# Patient Record
Sex: MALE | Race: WHITE | NOT HISPANIC OR LATINO | URBAN - METROPOLITAN AREA
[De-identification: names, ages, dates, MRNs, and addresses within clinical notes are randomized per-mention and may not be internally consistent; named-entity substitution may affect disease eponyms.]

---

## 2024-10-02 ENCOUNTER — INPATIENT (INPATIENT)
Facility: HOSPITAL | Age: 69
LOS: 7 days | Discharge: NURSING, REHAB OR LONG-TERM CARE | DRG: 563 | End: 2024-10-10
Attending: STUDENT IN AN ORGANIZED HEALTH CARE EDUCATION/TRAINING PROGRAM | Admitting: STUDENT IN AN ORGANIZED HEALTH CARE EDUCATION/TRAINING PROGRAM
Payer: COMMERCIAL

## 2024-10-02 VITALS
SYSTOLIC BLOOD PRESSURE: 164 MMHG | OXYGEN SATURATION: 98 % | RESPIRATION RATE: 18 BRPM | HEART RATE: 92 BPM | DIASTOLIC BLOOD PRESSURE: 80 MMHG | TEMPERATURE: 98 F

## 2024-10-02 LAB
ALBUMIN SERPL ELPH-MCNC: 4.7 G/DL — SIGNIFICANT CHANGE UP (ref 3.3–5)
ALP SERPL-CCNC: 99 U/L — SIGNIFICANT CHANGE UP (ref 40–120)
ALT FLD-CCNC: 59 U/L — HIGH (ref 10–45)
ANION GAP SERPL CALC-SCNC: 16 MMOL/L — SIGNIFICANT CHANGE UP (ref 5–17)
ANISOCYTOSIS BLD QL: SLIGHT — SIGNIFICANT CHANGE UP
APTT BLD: 29.1 SEC — SIGNIFICANT CHANGE UP (ref 24.5–35.6)
AST SERPL-CCNC: 40 U/L — SIGNIFICANT CHANGE UP (ref 10–40)
BASOPHILS # BLD AUTO: 0.18 K/UL — SIGNIFICANT CHANGE UP (ref 0–0.2)
BASOPHILS NFR BLD AUTO: 0.9 % — SIGNIFICANT CHANGE UP (ref 0–2)
BILIRUB SERPL-MCNC: 0.4 MG/DL — SIGNIFICANT CHANGE UP (ref 0.2–1.2)
BUN SERPL-MCNC: 16 MG/DL — SIGNIFICANT CHANGE UP (ref 7–23)
BURR CELLS BLD QL SMEAR: PRESENT — SIGNIFICANT CHANGE UP
CALCIUM SERPL-MCNC: 9.5 MG/DL — SIGNIFICANT CHANGE UP (ref 8.4–10.5)
CHLORIDE SERPL-SCNC: 105 MMOL/L — SIGNIFICANT CHANGE UP (ref 96–108)
CO2 SERPL-SCNC: 19 MMOL/L — LOW (ref 22–31)
CREAT SERPL-MCNC: 1.18 MG/DL — SIGNIFICANT CHANGE UP (ref 0.5–1.3)
DACRYOCYTES BLD QL SMEAR: SLIGHT — SIGNIFICANT CHANGE UP
EGFR: 68 ML/MIN/1.73M2 — SIGNIFICANT CHANGE UP
ELLIPTOCYTES BLD QL SMEAR: SLIGHT — SIGNIFICANT CHANGE UP
EOSINOPHIL # BLD AUTO: 0.18 K/UL — SIGNIFICANT CHANGE UP (ref 0–0.5)
EOSINOPHIL NFR BLD AUTO: 0.9 % — SIGNIFICANT CHANGE UP (ref 0–6)
ETHANOL SERPL-MCNC: 295 MG/DL — HIGH (ref 0–10)
GAS PNL BLDV: SIGNIFICANT CHANGE UP
GAS PNL BLDV: SIGNIFICANT CHANGE UP
GLUCOSE SERPL-MCNC: 148 MG/DL — HIGH (ref 70–99)
HCT VFR BLD CALC: 39.9 % — SIGNIFICANT CHANGE UP (ref 39–50)
HGB BLD-MCNC: 12.7 G/DL — LOW (ref 13–17)
HYPOCHROMIA BLD QL: SLIGHT — SIGNIFICANT CHANGE UP
INR BLD: 0.97 RATIO — SIGNIFICANT CHANGE UP (ref 0.85–1.16)
LYMPHOCYTES # BLD AUTO: 13 % — SIGNIFICANT CHANGE UP (ref 13–44)
LYMPHOCYTES # BLD AUTO: 2.54 K/UL — SIGNIFICANT CHANGE UP (ref 1–3.3)
MACROCYTES BLD QL: SLIGHT — SIGNIFICANT CHANGE UP
MANUAL SMEAR VERIFICATION: SIGNIFICANT CHANGE UP
MCHC RBC-ENTMCNC: 20.6 PG — LOW (ref 27–34)
MCHC RBC-ENTMCNC: 31.8 GM/DL — LOW (ref 32–36)
MCV RBC AUTO: 64.7 FL — LOW (ref 80–100)
MICROCYTES BLD QL: SIGNIFICANT CHANGE UP
MONOCYTES # BLD AUTO: 0.33 K/UL — SIGNIFICANT CHANGE UP (ref 0–0.9)
MONOCYTES NFR BLD AUTO: 1.7 % — LOW (ref 2–14)
NEUTROPHILS # BLD AUTO: 16.33 K/UL — HIGH (ref 1.8–7.4)
NEUTROPHILS NFR BLD AUTO: 83.5 % — HIGH (ref 43–77)
OVALOCYTES BLD QL SMEAR: SLIGHT — SIGNIFICANT CHANGE UP
PLAT MORPH BLD: NORMAL — SIGNIFICANT CHANGE UP
PLATELET # BLD AUTO: 336 K/UL — SIGNIFICANT CHANGE UP (ref 150–400)
POLYCHROMASIA BLD QL SMEAR: SLIGHT — SIGNIFICANT CHANGE UP
POTASSIUM SERPL-MCNC: 4.3 MMOL/L — SIGNIFICANT CHANGE UP (ref 3.5–5.3)
POTASSIUM SERPL-SCNC: 4.3 MMOL/L — SIGNIFICANT CHANGE UP (ref 3.5–5.3)
PROT SERPL-MCNC: 8.1 G/DL — SIGNIFICANT CHANGE UP (ref 6–8.3)
PROTHROM AB SERPL-ACNC: 11.1 SEC — SIGNIFICANT CHANGE UP (ref 9.9–13.4)
RBC # BLD: 6.17 M/UL — HIGH (ref 4.2–5.8)
RBC # FLD: 17.1 % — HIGH (ref 10.3–14.5)
RBC BLD AUTO: ABNORMAL
SCHISTOCYTES BLD QL AUTO: SLIGHT — SIGNIFICANT CHANGE UP
SODIUM SERPL-SCNC: 140 MMOL/L — SIGNIFICANT CHANGE UP (ref 135–145)
TARGETS BLD QL SMEAR: SLIGHT — SIGNIFICANT CHANGE UP
TROPONIN T, HIGH SENSITIVITY RESULT: 19 NG/L — SIGNIFICANT CHANGE UP (ref 0–51)
WBC # BLD: 19.56 K/UL — HIGH (ref 3.8–10.5)
WBC # FLD AUTO: 19.56 K/UL — HIGH (ref 3.8–10.5)

## 2024-10-02 PROCEDURE — 71260 CT THORAX DX C+: CPT | Mod: 26,MC

## 2024-10-02 PROCEDURE — 74177 CT ABD & PELVIS W/CONTRAST: CPT | Mod: 26,MC

## 2024-10-02 PROCEDURE — 72170 X-RAY EXAM OF PELVIS: CPT | Mod: 26

## 2024-10-02 PROCEDURE — 73000 X-RAY EXAM OF COLLAR BONE: CPT | Mod: 26,LT

## 2024-10-02 PROCEDURE — 73060 X-RAY EXAM OF HUMERUS: CPT | Mod: 26,LT

## 2024-10-02 PROCEDURE — 71045 X-RAY EXAM CHEST 1 VIEW: CPT | Mod: 26

## 2024-10-02 PROCEDURE — 70450 CT HEAD/BRAIN W/O DYE: CPT | Mod: 26,MC

## 2024-10-02 PROCEDURE — 99285 EMERGENCY DEPT VISIT HI MDM: CPT

## 2024-10-02 PROCEDURE — 73030 X-RAY EXAM OF SHOULDER: CPT | Mod: 26,LT

## 2024-10-02 PROCEDURE — 72125 CT NECK SPINE W/O DYE: CPT | Mod: 26,MC

## 2024-10-02 RX ORDER — FENTANYL CITRATE-0.9 % NACL/PF 300MCG/30
50 PATIENT CONTROLLED ANALGESIA VIAL INJECTION ONCE
Refills: 0 | Status: DISCONTINUED | OUTPATIENT
Start: 2024-10-02 | End: 2024-10-02

## 2024-10-02 RX ORDER — SODIUM CHLORIDE 0.9 % (FLUSH) 0.9 %
500 SYRINGE (ML) INJECTION ONCE
Refills: 0 | Status: COMPLETED | OUTPATIENT
Start: 2024-10-02 | End: 2024-10-02

## 2024-10-02 RX ORDER — MORPHINE SULFATE 30 MG/1
4 TABLET, FILM COATED, EXTENDED RELEASE ORAL ONCE
Refills: 0 | Status: DISCONTINUED | OUTPATIENT
Start: 2024-10-02 | End: 2024-10-02

## 2024-10-02 RX ORDER — FOLIC ACID 1 MG/1
1 TABLET ORAL ONCE
Refills: 0 | Status: COMPLETED | OUTPATIENT
Start: 2024-10-02 | End: 2024-10-02

## 2024-10-02 RX ORDER — THIAMINE HYDROCHLORIDE 100 MG/ML
100 INJECTION, SOLUTION INTRAMUSCULAR; INTRAVENOUS ONCE
Refills: 0 | Status: COMPLETED | OUTPATIENT
Start: 2024-10-02 | End: 2024-10-02

## 2024-10-02 RX ORDER — OXYCODONE HYDROCHLORIDE 30 MG/1
5 TABLET, FILM COATED, EXTENDED RELEASE ORAL ONCE
Refills: 0 | Status: DISCONTINUED | OUTPATIENT
Start: 2024-10-02 | End: 2024-10-02

## 2024-10-02 RX ORDER — ACETAMINOPHEN 325 MG
1000 TABLET ORAL ONCE
Refills: 0 | Status: COMPLETED | OUTPATIENT
Start: 2024-10-02 | End: 2024-10-02

## 2024-10-02 RX ADMIN — Medication 50 MICROGRAM(S): at 19:15

## 2024-10-02 RX ADMIN — Medication 400 MILLIGRAM(S): at 19:49

## 2024-10-02 RX ADMIN — Medication 500 MILLILITER(S): at 22:56

## 2024-10-02 RX ADMIN — MORPHINE SULFATE 4 MILLIGRAM(S): 30 TABLET, FILM COATED, EXTENDED RELEASE ORAL at 22:24

## 2024-10-02 RX ADMIN — FOLIC ACID 1 MILLIGRAM(S): 1 TABLET ORAL at 20:41

## 2024-10-02 RX ADMIN — THIAMINE HYDROCHLORIDE 100 MILLIGRAM(S): 100 INJECTION, SOLUTION INTRAMUSCULAR; INTRAVENOUS at 20:41

## 2024-10-02 NOTE — ED PROVIDER NOTE - OTHER FINDINGS
ECG recorded at 7:18 PM independently interpreted by me , Dr Wilfredo Baird,  at 7:22 PM shows normal sinus rhythm normal axis normal intervals QTc 445 ms no acute diagnostic ischemic ST-T changes

## 2024-10-02 NOTE — ED PROVIDER NOTE - CARE PLAN
Principal Discharge DX:	Fracture of shaft of left clavicle  Secondary Diagnosis:	Acute alcohol intoxication   1

## 2024-10-02 NOTE — ED ADULT TRIAGE NOTE - AVIAN FLU SYMPTOMS
Notified pt of CT urogram results and Dr. Hernandez's recommendation to f/u in 6 mo with [facility]. Pt verbalizes understanding, is agreeable to plan, and has no questions or concerns.  
No

## 2024-10-02 NOTE — ED ADULT NURSE NOTE - OBJECTIVE STATEMENT
69 y male presents s/p fall. Pt BIBEMS, EMS states pt was found head first at bottom of 10 step flight of stairs. pt endorses ETOH ingestion but does not specify specific amount stating "not that much". Pt states he does not remember falling or how he fell, unknown LOC. Pt complaining of bilateral shoulder pain with the left shoulder having more severe pain. Pt not consistently answering questions during assessment therefore unable to get full subjective assessment.

## 2024-10-02 NOTE — ED PROVIDER NOTE - ATTENDING CONTRIBUTION TO CARE
Attending MD Baird:  I have seen and examined this patient and fully participated in the care of this patient as the teaching attending. I personally made/approved the management plan and take responsibility for the patient management.        69-year-old gentleman with a history of alcohol misuse disorder COPD hypertension is presenting via EMS for a fall down a flight of stairs.  EMS reports that he fell down possibly 10 stairs.  He was found at the bottom face first on the ground.  He was responsive but clinically intoxicated further assessment.  The patient himself is complaining of left shoulder pain at this time, he is awake and alert but smells of alcohol and clinically appears intoxicated.  He states he does not remember what happened to him today but does admit to drinking alcohol but would not quantify exactly how much he drank.    Patient's vital signs are nonactionable.  He is awake and alert GCS is 14.  Head is atraumatic cervical collar in place.  No obvious facial injury trachea midline the chest wall is nontender breath sounds present equal bilateral anterior lung fields abdomen nontender no bony pelvis tenderness.  There is tenderness of the left mid clavicle and anterior shoulder distal pulses palpable patient moves all extremity spontaneously.    Patient is presenting for evaluation of shoulder pain after fall down a flight of stairs, clinically intoxicated at this time admits to alcohol.  Given possible fall down flight of stairs will obtain pan CT scan x-rays of the left shoulder clavicle and humerus, analgesia alcohol level and reassessment      *The above represents an initial assessment/impression. Please refer to progress notes for potential changes in patient clinical course*

## 2024-10-02 NOTE — ED PROVIDER NOTE - PROGRESS NOTE DETAILS
lynne- Spoke  with Ortho resident regarding clavicular fracture.  X-rays reviewed.  States that this will likely require operative intervention but does not require stat orthopedics consult.  Patient can follow-up outpatient

## 2024-10-03 DIAGNOSIS — Z29.9 ENCOUNTER FOR PROPHYLACTIC MEASURES, UNSPECIFIED: ICD-10-CM

## 2024-10-03 DIAGNOSIS — F10.929 ALCOHOL USE, UNSPECIFIED WITH INTOXICATION, UNSPECIFIED: ICD-10-CM

## 2024-10-03 DIAGNOSIS — S42.022A DISPLACED FRACTURE OF SHAFT OF LEFT CLAVICLE, INITIAL ENCOUNTER FOR CLOSED FRACTURE: ICD-10-CM

## 2024-10-03 DIAGNOSIS — J44.9 CHRONIC OBSTRUCTIVE PULMONARY DISEASE, UNSPECIFIED: ICD-10-CM
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
## 2024-10-03 DIAGNOSIS — S42.009A FRACTURE OF UNSPECIFIED PART OF UNSPECIFIED CLAVICLE, INITIAL ENCOUNTER FOR CLOSED FRACTURE: ICD-10-CM

## 2024-10-03 LAB
ALBUMIN SERPL ELPH-MCNC: 4.2 G/DL — SIGNIFICANT CHANGE UP (ref 3.3–5)
ALP SERPL-CCNC: 86 U/L — SIGNIFICANT CHANGE UP (ref 40–120)
ALT FLD-CCNC: 53 U/L — HIGH (ref 10–45)
AMPHET UR-MCNC: NEGATIVE — SIGNIFICANT CHANGE UP
ANION GAP SERPL CALC-SCNC: 15 MMOL/L — SIGNIFICANT CHANGE UP (ref 5–17)
ANION GAP SERPL CALC-SCNC: 16 MMOL/L — SIGNIFICANT CHANGE UP (ref 5–17)
APPEARANCE UR: CLEAR — SIGNIFICANT CHANGE UP
APTT BLD: 29.9 SEC — SIGNIFICANT CHANGE UP (ref 24.5–35.6)
AST SERPL-CCNC: 66 U/L — HIGH (ref 10–40)
BACTERIA # UR AUTO: NEGATIVE /HPF — SIGNIFICANT CHANGE UP
BARBITURATES UR SCN-MCNC: NEGATIVE — SIGNIFICANT CHANGE UP
BENZODIAZ UR-MCNC: NEGATIVE — SIGNIFICANT CHANGE UP
BILIRUB DIRECT SERPL-MCNC: 0.2 MG/DL — SIGNIFICANT CHANGE UP (ref 0–0.3)
BILIRUB INDIRECT FLD-MCNC: 0.5 MG/DL — SIGNIFICANT CHANGE UP (ref 0.2–1)
BILIRUB SERPL-MCNC: 0.7 MG/DL — SIGNIFICANT CHANGE UP (ref 0.2–1.2)
BILIRUB UR-MCNC: NEGATIVE — SIGNIFICANT CHANGE UP
BUN SERPL-MCNC: 13 MG/DL — SIGNIFICANT CHANGE UP (ref 7–23)
BUN SERPL-MCNC: 14 MG/DL — SIGNIFICANT CHANGE UP (ref 7–23)
CALCIUM SERPL-MCNC: 9.1 MG/DL — SIGNIFICANT CHANGE UP (ref 8.4–10.5)
CALCIUM SERPL-MCNC: 9.4 MG/DL — SIGNIFICANT CHANGE UP (ref 8.4–10.5)
CAST: 2 /LPF — SIGNIFICANT CHANGE UP (ref 0–4)
CHLORIDE SERPL-SCNC: 106 MMOL/L — SIGNIFICANT CHANGE UP (ref 96–108)
CHLORIDE SERPL-SCNC: 108 MMOL/L — SIGNIFICANT CHANGE UP (ref 96–108)
CO2 SERPL-SCNC: 18 MMOL/L — LOW (ref 22–31)
CO2 SERPL-SCNC: 19 MMOL/L — LOW (ref 22–31)
COCAINE METAB.OTHER UR-MCNC: NEGATIVE — SIGNIFICANT CHANGE UP
COLOR SPEC: YELLOW — SIGNIFICANT CHANGE UP
CREAT SERPL-MCNC: 0.98 MG/DL — SIGNIFICANT CHANGE UP (ref 0.5–1.3)
CREAT SERPL-MCNC: 1.04 MG/DL — SIGNIFICANT CHANGE UP (ref 0.5–1.3)
DIFF PNL FLD: ABNORMAL
EGFR: 79 ML/MIN/1.73M2 — SIGNIFICANT CHANGE UP
EGFR: 85 ML/MIN/1.73M2 — SIGNIFICANT CHANGE UP
ETHANOL SERPL-MCNC: 78 MG/DL — HIGH (ref 0–10)
GLUCOSE SERPL-MCNC: 134 MG/DL — HIGH (ref 70–99)
GLUCOSE SERPL-MCNC: 136 MG/DL — HIGH (ref 70–99)
GLUCOSE UR QL: NEGATIVE MG/DL — SIGNIFICANT CHANGE UP
HCT VFR BLD CALC: 33.9 % — LOW (ref 39–50)
HGB BLD-MCNC: 11.1 G/DL — LOW (ref 13–17)
HYALINE CASTS # UR AUTO: PRESENT
INR BLD: 1.06 RATIO — SIGNIFICANT CHANGE UP (ref 0.85–1.16)
KETONES UR-MCNC: NEGATIVE MG/DL — SIGNIFICANT CHANGE UP
LACTATE BLDV-MCNC: 1.6 MMOL/L — SIGNIFICANT CHANGE UP (ref 0.5–2)
LACTATE SERPL-SCNC: 3.7 MMOL/L — HIGH (ref 0.5–2)
LEUKOCYTE ESTERASE UR-ACNC: NEGATIVE — SIGNIFICANT CHANGE UP
MAGNESIUM SERPL-MCNC: 1.6 MG/DL — SIGNIFICANT CHANGE UP (ref 1.6–2.6)
MCHC RBC-ENTMCNC: 20.7 PG — LOW (ref 27–34)
MCHC RBC-ENTMCNC: 32.7 GM/DL — SIGNIFICANT CHANGE UP (ref 32–36)
MCV RBC AUTO: 63.1 FL — LOW (ref 80–100)
METHADONE UR-MCNC: NEGATIVE — SIGNIFICANT CHANGE UP
NITRITE UR-MCNC: NEGATIVE — SIGNIFICANT CHANGE UP
NRBC # BLD: 0 /100 WBCS — SIGNIFICANT CHANGE UP (ref 0–0)
OPIATES UR-MCNC: POSITIVE
OXYCODONE UR-MCNC: NEGATIVE — SIGNIFICANT CHANGE UP
PCP SPEC-MCNC: SIGNIFICANT CHANGE UP
PCP UR-MCNC: NEGATIVE — SIGNIFICANT CHANGE UP
PH UR: 5.5 — SIGNIFICANT CHANGE UP (ref 5–8)
PHOSPHATE SERPL-MCNC: 3.5 MG/DL — SIGNIFICANT CHANGE UP (ref 2.5–4.5)
PLATELET # BLD AUTO: 284 K/UL — SIGNIFICANT CHANGE UP (ref 150–400)
POTASSIUM SERPL-MCNC: 4.4 MMOL/L — SIGNIFICANT CHANGE UP (ref 3.5–5.3)
POTASSIUM SERPL-MCNC: 4.5 MMOL/L — SIGNIFICANT CHANGE UP (ref 3.5–5.3)
POTASSIUM SERPL-SCNC: 4.4 MMOL/L — SIGNIFICANT CHANGE UP (ref 3.5–5.3)
POTASSIUM SERPL-SCNC: 4.5 MMOL/L — SIGNIFICANT CHANGE UP (ref 3.5–5.3)
PROT SERPL-MCNC: 7 G/DL — SIGNIFICANT CHANGE UP (ref 6–8.3)
PROT UR-MCNC: NEGATIVE MG/DL — SIGNIFICANT CHANGE UP
PROTHROM AB SERPL-ACNC: 12.1 SEC — SIGNIFICANT CHANGE UP (ref 9.9–13.4)
RBC # BLD: 5.37 M/UL — SIGNIFICANT CHANGE UP (ref 4.2–5.8)
RBC # FLD: 15.8 % — HIGH (ref 10.3–14.5)
RBC CASTS # UR COMP ASSIST: 0 /HPF — SIGNIFICANT CHANGE UP (ref 0–4)
REVIEW: SIGNIFICANT CHANGE UP
SODIUM SERPL-SCNC: 140 MMOL/L — SIGNIFICANT CHANGE UP (ref 135–145)
SODIUM SERPL-SCNC: 142 MMOL/L — SIGNIFICANT CHANGE UP (ref 135–145)
SP GR SPEC: >1.03 — HIGH (ref 1–1.03)
SQUAMOUS # UR AUTO: 0 /HPF — SIGNIFICANT CHANGE UP (ref 0–5)
THC UR QL: NEGATIVE — SIGNIFICANT CHANGE UP
UROBILINOGEN FLD QL: 0.2 MG/DL — SIGNIFICANT CHANGE UP (ref 0.2–1)
WBC # BLD: 13.32 K/UL — HIGH (ref 3.8–10.5)
WBC # FLD AUTO: 13.32 K/UL — HIGH (ref 3.8–10.5)
WBC UR QL: 0 /HPF — SIGNIFICANT CHANGE UP (ref 0–5)

## 2024-10-03 PROCEDURE — 99223 1ST HOSP IP/OBS HIGH 75: CPT

## 2024-10-03 PROCEDURE — 23570 CLTX SCAPULAR FX W/O MNPJ: CPT | Mod: RT

## 2024-10-03 PROCEDURE — 73000 X-RAY EXAM OF COLLAR BONE: CPT | Mod: 26,RT

## 2024-10-03 PROCEDURE — 23500 CLTX CLAVICULAR FX W/O MNPJ: CPT | Mod: 50

## 2024-10-03 PROCEDURE — 99222 1ST HOSP IP/OBS MODERATE 55: CPT | Mod: 57

## 2024-10-03 RX ORDER — MULTI VITAMIN/MINERAL SUPPLEMENT WITH ASCORBIC ACID, NIACIN, PYRIDOXINE, PANTOTHENIC ACID, FOLIC ACID, RIBOFLAVIN, THIAMIN, BIOTIN, COBALAMIN AND ZINC. 60; 20; 12.5; 10; 10; 1.7; 1.5; 1; .3; .006 MG/1; MG/1; MG/1; MG/1; MG/1; MG/1; MG/1; MG/1; MG/1; MG/1
1 TABLET, COATED ORAL DAILY
Refills: 0 | Status: DISCONTINUED | OUTPATIENT
Start: 2024-10-03 | End: 2024-10-10

## 2024-10-03 RX ORDER — SODIUM CHLORIDE 0.9 % (FLUSH) 0.9 %
1000 SYRINGE (ML) INJECTION
Refills: 0 | Status: DISCONTINUED | OUTPATIENT
Start: 2024-10-03 | End: 2024-10-03

## 2024-10-03 RX ORDER — MAGNESIUM SULFATE 500 MG/ML
2 VIAL (ML) INJECTION ONCE
Refills: 0 | Status: COMPLETED | OUTPATIENT
Start: 2024-10-03 | End: 2024-10-03

## 2024-10-03 RX ORDER — ONDANSETRON HCL/PF 4 MG/2 ML
4 VIAL (ML) INJECTION ONCE
Refills: 0 | Status: COMPLETED | OUTPATIENT
Start: 2024-10-03 | End: 2024-10-03

## 2024-10-03 RX ORDER — BENZOCAINE AND LEVOMENTHOL 200; 5 MG/G; MG/G
1 SPRAY TOPICAL THREE TIMES A DAY
Refills: 0 | Status: DISCONTINUED | OUTPATIENT
Start: 2024-10-03 | End: 2024-10-10

## 2024-10-03 RX ORDER — FLUTICASONE PROPION/SALMETEROL 100-50 MCG
1 BLISTER, WITH INHALATION DEVICE INHALATION
Refills: 0 | Status: DISCONTINUED | OUTPATIENT
Start: 2024-10-03 | End: 2024-10-10

## 2024-10-03 RX ORDER — FOLIC ACID 1 MG/1
1 TABLET ORAL DAILY
Refills: 0 | Status: DISCONTINUED | OUTPATIENT
Start: 2024-10-03 | End: 2024-10-10

## 2024-10-03 RX ORDER — INFLUENZA VIRUS VACCINE 15; 15; 15; 15 UG/.5ML; UG/.5ML; UG/.5ML; UG/.5ML
0.5 SUSPENSION INTRAMUSCULAR ONCE
Refills: 0 | Status: DISCONTINUED | OUTPATIENT
Start: 2024-10-03 | End: 2024-10-10

## 2024-10-03 RX ORDER — ACETAMINOPHEN 325 MG
650 TABLET ORAL EVERY 6 HOURS
Refills: 0 | Status: DISCONTINUED | OUTPATIENT
Start: 2024-10-03 | End: 2024-10-10

## 2024-10-03 RX ORDER — ATORVASTATIN CALCIUM 10 MG/1
40 TABLET, FILM COATED ORAL AT BEDTIME
Refills: 0 | Status: DISCONTINUED | OUTPATIENT
Start: 2024-10-03 | End: 2024-10-10

## 2024-10-03 RX ORDER — MORPHINE SULFATE 30 MG/1
4 TABLET, FILM COATED, EXTENDED RELEASE ORAL ONCE
Refills: 0 | Status: DISCONTINUED | OUTPATIENT
Start: 2024-10-03 | End: 2024-10-03

## 2024-10-03 RX ORDER — ONDANSETRON HCL/PF 4 MG/2 ML
4 VIAL (ML) INJECTION EVERY 8 HOURS
Refills: 0 | Status: DISCONTINUED | OUTPATIENT
Start: 2024-10-03 | End: 2024-10-10

## 2024-10-03 RX ORDER — OXYCODONE HYDROCHLORIDE 30 MG/1
5 TABLET, FILM COATED, EXTENDED RELEASE ORAL EVERY 6 HOURS
Refills: 0 | Status: DISCONTINUED | OUTPATIENT
Start: 2024-10-03 | End: 2024-10-10

## 2024-10-03 RX ORDER — ACETAMINOPHEN 325 MG
975 TABLET ORAL ONCE
Refills: 0 | Status: COMPLETED | OUTPATIENT
Start: 2024-10-03 | End: 2024-10-03

## 2024-10-03 RX ORDER — TIOTROPIUM BROMIDE INHALATION SPRAY 3.12 UG/1
2 SPRAY, METERED RESPIRATORY (INHALATION) DAILY
Refills: 0 | Status: DISCONTINUED | OUTPATIENT
Start: 2024-10-03 | End: 2024-10-10

## 2024-10-03 RX ORDER — THIAMINE HYDROCHLORIDE 100 MG/ML
100 INJECTION, SOLUTION INTRAMUSCULAR; INTRAVENOUS DAILY
Refills: 0 | Status: COMPLETED | OUTPATIENT
Start: 2024-10-03 | End: 2024-10-06

## 2024-10-03 RX ORDER — OXYCODONE HYDROCHLORIDE 30 MG/1
5 TABLET, FILM COATED, EXTENDED RELEASE ORAL EVERY 6 HOURS
Refills: 0 | Status: DISCONTINUED | OUTPATIENT
Start: 2024-10-03 | End: 2024-10-03

## 2024-10-03 RX ADMIN — OXYCODONE HYDROCHLORIDE 5 MILLIGRAM(S): 30 TABLET, FILM COATED, EXTENDED RELEASE ORAL at 17:15

## 2024-10-03 RX ADMIN — Medication 650 MILLIGRAM(S): at 21:34

## 2024-10-03 RX ADMIN — MORPHINE SULFATE 4 MILLIGRAM(S): 30 TABLET, FILM COATED, EXTENDED RELEASE ORAL at 03:09

## 2024-10-03 RX ADMIN — Medication 975 MILLIGRAM(S): at 00:44

## 2024-10-03 RX ADMIN — MULTI VITAMIN/MINERAL SUPPLEMENT WITH ASCORBIC ACID, NIACIN, PYRIDOXINE, PANTOTHENIC ACID, FOLIC ACID, RIBOFLAVIN, THIAMIN, BIOTIN, COBALAMIN AND ZINC. 1 TABLET(S): 60; 20; 12.5; 10; 10; 1.7; 1.5; 1; .3; .006 TABLET, COATED ORAL at 18:25

## 2024-10-03 RX ADMIN — OXYCODONE HYDROCHLORIDE 5 MILLIGRAM(S): 30 TABLET, FILM COATED, EXTENDED RELEASE ORAL at 06:00

## 2024-10-03 RX ADMIN — THIAMINE HYDROCHLORIDE 100 MILLIGRAM(S): 100 INJECTION, SOLUTION INTRAMUSCULAR; INTRAVENOUS at 18:25

## 2024-10-03 RX ADMIN — Medication 10 MILLIGRAM(S): at 05:41

## 2024-10-03 RX ADMIN — OXYCODONE HYDROCHLORIDE 5 MILLIGRAM(S): 30 TABLET, FILM COATED, EXTENDED RELEASE ORAL at 12:40

## 2024-10-03 RX ADMIN — Medication 150 MILLILITER(S): at 03:23

## 2024-10-03 RX ADMIN — ATORVASTATIN CALCIUM 40 MILLIGRAM(S): 10 TABLET, FILM COATED ORAL at 21:35

## 2024-10-03 RX ADMIN — Medication 4 MILLIGRAM(S): at 04:36

## 2024-10-03 RX ADMIN — Medication 1 DOSE(S): at 17:17

## 2024-10-03 RX ADMIN — Medication 25 GRAM(S): at 14:19

## 2024-10-03 RX ADMIN — Medication 10 MILLIGRAM(S): at 17:15

## 2024-10-03 RX ADMIN — FOLIC ACID 1 MILLIGRAM(S): 1 TABLET ORAL at 17:15

## 2024-10-03 RX ADMIN — BENZOCAINE AND LEVOMENTHOL 1 LOZENGE: 200; 5 SPRAY TOPICAL at 21:54

## 2024-10-03 RX ADMIN — OXYCODONE HYDROCHLORIDE 5 MILLIGRAM(S): 30 TABLET, FILM COATED, EXTENDED RELEASE ORAL at 13:40

## 2024-10-03 NOTE — H&P ADULT - MLM HIDDEN
Post-Anesthesia Evaluation & Assessment    Visit Vitals    /69    Pulse (!) 112    Temp 36.7 °C (98.1 °F)    Resp 17    Ht 5' 8\" (1.727 m)    Wt 99.8 kg (220 lb)    SpO2 100%    BMI 33.45 kg/m2       Post-operative hydration adequate. Pain score (VAS): 0 Pain Scale 1: Numeric (0 - 10) (10/04/17 1702)  Pain Intensity 1: 0 (10/04/17 1702)   Managed. Mental status & Level of consciousness: returned to baseline    Neurological status: returned to baseline    Pulmonary status: airway patent, oxygen given as needed. Complications related to anesthesia: none    Patient has met all discharge requirements.     Additional comments:        Anneliese Villalobos MD  October 5, 2017
yes

## 2024-10-03 NOTE — H&P ADULT - NSVTERISKASSESS_GEN_ALL_CORE FT
Medical Assessment Completed on: 03-Oct-2024 13:39 Medical Assessment Completed on: 03-Oct-2024 15:04

## 2024-10-03 NOTE — H&P ADULT - NSHPREVIEWOFSYSTEMS_GEN_ALL_CORE
CONSTITUTIONAL: No fever/chills.  HEENT: No cough. No runny nose.   RESPIRATORY: No shortness of breath. No wheezes.   CARDIOVASCULAR: No chest pain. No palpitations   GASTROINTESTINAL: No abdominal pain. No nausea/ vomiting. No diarrhea. No constipation. No melena/hematochezia.  GENITOURINARY: No dysuria, frequency or hematuria  NEUROLOGICAL: + headache   MSK: chronic back pain. acute bilateral shoulder pain.

## 2024-10-03 NOTE — CONSULT NOTE ADULT - SUBJECTIVE AND OBJECTIVE BOX
69y Male presents c/o b/l shoulder pain s/p mechanical fall down a flight of stairs at home. Pt has hx of alcohol misuse syndrome. Unable to deterimne headstrike/LOC. Denies numbness, tingling paresthesias in affected extremities. Unable to ambulate after fall. No other orthopedic injuries at this time.    PAST MEDICAL & SURGICAL HISTORY:    MEDICATIONS  (STANDING):    Allergies    No Known Allergies    Intolerances                            12.7   19.56 )-----------( 336      ( 02 Oct 2024 19:30 )             39.9     02 Oct 2024 19:30    140    |  105    |  16     ----------------------------<  148    4.3     |  19     |  1.18     Ca    9.5        02 Oct 2024 19:30    TPro  8.1    /  Alb  4.7    /  TBili  0.4    /  DBili  x      /  AST  40     /  ALT  59     /  AlkPhos  99     02 Oct 2024 19:30    PT/INR - ( 02 Oct 2024 19:30 )   PT: 11.1 sec;   INR: 0.97 ratio         PTT - ( 02 Oct 2024 19:30 )  PTT:29.1 sec    Imaging: XR personally reviewed and demonstates R nondisplaced Clavicle Fracture, L comminuted clavicle fx  CT chst shows above injuries + scapular spine fx    Vital Signs Last 24 Hrs  T(C): 36.6 (10-02-24 @ 19:20), Max: 36.6 (10-02-24 @ 19:20)  T(F): 97.8 (10-02-24 @ 19:20), Max: 97.8 (10-02-24 @ 19:20)  HR: 99 (10-02-24 @ 22:20) (92 - 99)  BP: 138/79 (10-02-24 @ 22:20) (138/79 - 173/73)  BP(mean): 96 (10-02-24 @ 22:20) (96 - 96)  RR: 25 (10-02-24 @ 22:20) (18 - 25)  SpO2: 98% (10-02-24 @ 22:20) (97% - 98%)  Gen: NAD  RUE: Skin intact, +ecchymosis over clavicle, no tenting of the skin, + TTP over clavicle, unable to range shoulder 2/2 pain, +ain/pin/m/r/u function, SILT C5-T1, radial pulse intact, compartments soft, brisk cap refill, no bony ttp at elbow/wrist/hand/fingers.    LUE: Skin intact, +ecchymosis over clavicle, no tenting of the skin, + TTP over clavicle, unable to range shoulder 2/2 pain, +ain/pin/m/r/u function, SILT C5-T1, radial pulse intact, compartments soft, brisk cap refill, no bony ttp at elbow/wrist/hand/fingers.      Secondary Survey: Full ROM of unaffected extremities, SILT globally, compartments soft, no bony TTP over bony prominences, no calf TTP, able to SLR with contralateral leg, no TTP along axial spine.    A/P: 69y Male with b/l clavicle fractures and a R scapular spine fx  -Pain control  -NWB b/l UE in sling  -Ice  -Active movement of fingers/wrist/elbow encouraged  -May consider ORIF of left clavicle, plan to be discussed w Dr Horta in AM  -Pt poor operative candidate due to hx of alcohol misuse syndrome  -Will tentatively pre-op pt, pre op labs  -Please document medical clearance 69y Male presents c/o b/l shoulder pain s/p mechanical fall down a flight of stairs at home. Pt has hx of alcohol misuse syndrome. Unable to deterimne headstrike/LOC. Denies numbness, tingling paresthesias in affected extremities. Unable to ambulate after fall. No other orthopedic injuries at this time.    PAST MEDICAL & SURGICAL HISTORY:    MEDICATIONS  (STANDING):    Allergies    No Known Allergies    Intolerances                            12.7   19.56 )-----------( 336      ( 02 Oct 2024 19:30 )             39.9     02 Oct 2024 19:30    140    |  105    |  16     ----------------------------<  148    4.3     |  19     |  1.18     Ca    9.5        02 Oct 2024 19:30    TPro  8.1    /  Alb  4.7    /  TBili  0.4    /  DBili  x      /  AST  40     /  ALT  59     /  AlkPhos  99     02 Oct 2024 19:30    PT/INR - ( 02 Oct 2024 19:30 )   PT: 11.1 sec;   INR: 0.97 ratio         PTT - ( 02 Oct 2024 19:30 )  PTT:29.1 sec    Imaging: XR personally reviewed and demonstates R nondisplaced Clavicle Fracture, L comminuted clavicle fx  CT chst shows above injuries + scapular spine fx    Vital Signs Last 24 Hrs  T(C): 36.6 (10-02-24 @ 19:20), Max: 36.6 (10-02-24 @ 19:20)  T(F): 97.8 (10-02-24 @ 19:20), Max: 97.8 (10-02-24 @ 19:20)  HR: 99 (10-02-24 @ 22:20) (92 - 99)  BP: 138/79 (10-02-24 @ 22:20) (138/79 - 173/73)  BP(mean): 96 (10-02-24 @ 22:20) (96 - 96)  RR: 25 (10-02-24 @ 22:20) (18 - 25)  SpO2: 98% (10-02-24 @ 22:20) (97% - 98%)  Gen: NAD  RUE: Skin intact, +ecchymosis over clavicle, no tenting of the skin, + TTP over clavicle, unable to range shoulder 2/2 pain, +ain/pin/m/r/u function, SILT C5-T1, radial pulse intact, compartments soft, brisk cap refill, no bony ttp at elbow/wrist/hand/fingers.    LUE: Skin intact, +ecchymosis over clavicle, no tenting of the skin, + TTP over clavicle, unable to range shoulder 2/2 pain, +ain/pin/m/r/u function, SILT C5-T1, radial pulse intact, compartments soft, brisk cap refill, no bony ttp at elbow/wrist/hand/fingers.      Secondary Survey: Full ROM of unaffected extremities, SILT globally, compartments soft, no bony TTP over bony prominences, no calf TTP, able to SLR with contralateral leg, no TTP along axial spine.    A/P: 69y Male with b/l clavicle fractures and a R scapular spine fx  -Pain control  -NWB b/l UE in sling  -Ice  -Active movement of fingers/wrist/elbow encouraged  -May consider ORIF of left clavicle, plan to be discussed w Dr Horta in AM  -Pt poor operative candidate due to hx of alcohol misuse syndrome  -Will tentatively pre-op pt, pre op labs  -Please document medical clearance  -Please document trauma clearance 69y Male presents c/o b/l shoulder pain s/p mechanical fall down a flight of stairs at home. Pt has hx of alcohol misuse syndrome. Unable to deterimne headstrike/LOC. Denies numbness, tingling paresthesias in affected extremities. Unable to ambulate after fall. No other orthopedic injuries at this time.    PAST MEDICAL & SURGICAL HISTORY:    MEDICATIONS  (STANDING):    Allergies    No Known Allergies    Intolerances                            12.7   19.56 )-----------( 336      ( 02 Oct 2024 19:30 )             39.9     02 Oct 2024 19:30    140    |  105    |  16     ----------------------------<  148    4.3     |  19     |  1.18     Ca    9.5        02 Oct 2024 19:30    TPro  8.1    /  Alb  4.7    /  TBili  0.4    /  DBili  x      /  AST  40     /  ALT  59     /  AlkPhos  99     02 Oct 2024 19:30    PT/INR - ( 02 Oct 2024 19:30 )   PT: 11.1 sec;   INR: 0.97 ratio         PTT - ( 02 Oct 2024 19:30 )  PTT:29.1 sec    Imaging: XR personally reviewed and demonstates R nondisplaced Clavicle Fracture, L comminuted clavicle fx  CT chst shows above injuries + scapular spine fx    Vital Signs Last 24 Hrs  T(C): 36.6 (10-02-24 @ 19:20), Max: 36.6 (10-02-24 @ 19:20)  T(F): 97.8 (10-02-24 @ 19:20), Max: 97.8 (10-02-24 @ 19:20)  HR: 99 (10-02-24 @ 22:20) (92 - 99)  BP: 138/79 (10-02-24 @ 22:20) (138/79 - 173/73)  BP(mean): 96 (10-02-24 @ 22:20) (96 - 96)  RR: 25 (10-02-24 @ 22:20) (18 - 25)  SpO2: 98% (10-02-24 @ 22:20) (97% - 98%)  Gen: NAD  RUE: Skin intact, +ecchymosis over clavicle, no tenting of the skin, + TTP over clavicle, unable to range shoulder 2/2 pain, +ain/pin/m/r/u function, SILT C5-T1, radial pulse intact, compartments soft, brisk cap refill, no bony ttp at elbow/wrist/hand/fingers.    LUE: Skin intact, +ecchymosis over clavicle, no tenting of the skin, + TTP over clavicle, unable to range shoulder 2/2 pain, +ain/pin/m/r/u function, SILT C5-T1, radial pulse intact, compartments soft, brisk cap refill, no bony ttp at elbow/wrist/hand/fingers.      Secondary Survey: Full ROM of unaffected extremities, SILT globally, compartments soft, no bony TTP over bony prominences, no calf TTP, able to SLR with contralateral leg, no TTP along axial spine.    A/P: 69y Male with b/l clavicle fractures and a R scapular spine fx  -Pain control  -NWB b/l UE in sling  -Ice  -Active movement of fingers/wrist/elbow encouraged  -May consider ORIF of left clavicle, plan to be discussed w Dr Horta in AM  -Pt poor operative candidate due to hx of alcohol misuse syndrome  -Will tentatively pre-op pt, pre op labs  -Please document medical clearance   69y Male presents c/o b/l shoulder pain s/p mechanical fall down a flight of stairs at home. Pt has hx of alcohol misuse syndrome. Unable to deterimne headstrike/LOC. Denies numbness, tingling paresthesias in affected extremities. Unable to ambulate after fall. No other orthopedic injuries at this time.    PAST MEDICAL & SURGICAL HISTORY:    MEDICATIONS  (STANDING):    Allergies    No Known Allergies    Intolerances                            12.7   19.56 )-----------( 336      ( 02 Oct 2024 19:30 )             39.9     02 Oct 2024 19:30    140    |  105    |  16     ----------------------------<  148    4.3     |  19     |  1.18     Ca    9.5        02 Oct 2024 19:30    TPro  8.1    /  Alb  4.7    /  TBili  0.4    /  DBili  x      /  AST  40     /  ALT  59     /  AlkPhos  99     02 Oct 2024 19:30    PT/INR - ( 02 Oct 2024 19:30 )   PT: 11.1 sec;   INR: 0.97 ratio         PTT - ( 02 Oct 2024 19:30 )  PTT:29.1 sec    Imaging: XR personally reviewed and demonstates R nondisplaced Clavicle Fracture, L comminuted clavicle fx  CT chst shows above injuries + scapular spine fx    Vital Signs Last 24 Hrs  T(C): 36.6 (10-02-24 @ 19:20), Max: 36.6 (10-02-24 @ 19:20)  T(F): 97.8 (10-02-24 @ 19:20), Max: 97.8 (10-02-24 @ 19:20)  HR: 99 (10-02-24 @ 22:20) (92 - 99)  BP: 138/79 (10-02-24 @ 22:20) (138/79 - 173/73)  BP(mean): 96 (10-02-24 @ 22:20) (96 - 96)  RR: 25 (10-02-24 @ 22:20) (18 - 25)  SpO2: 98% (10-02-24 @ 22:20) (97% - 98%)  Gen: NAD  RUE: Skin intact, +ecchymosis over clavicle, no tenting of the skin, + TTP over clavicle, unable to range shoulder 2/2 pain, +ain/pin/m/r/u function, SILT C5-T1, radial pulse intact, compartments soft, brisk cap refill, no bony ttp at elbow/wrist/hand/fingers.    LUE: Skin intact, +ecchymosis over clavicle, no tenting of the skin, + TTP over clavicle, unable to range shoulder 2/2 pain, +ain/pin/m/r/u function, SILT C5-T1, radial pulse intact, compartments soft, brisk cap refill, no bony ttp at elbow/wrist/hand/fingers.      Secondary Survey: Full ROM of unaffected extremities, SILT globally, compartments soft, no bony TTP over bony prominences, no calf TTP, able to SLR with contralateral leg, no TTP along axial spine.    A/P: 69y Male with b/l clavicle fractures and a R scapular spine fx  -Pain control  -NWB b/l UE in sling  -Ice  -Active movement of fingers/wrist/elbow encouraged  -Pt poor operative candidate due to hx of alcohol misuse syndrome  -Non operative management at this time  -Outpt follow up  -Orthopedics signing off

## 2024-10-03 NOTE — H&P ADULT - PROBLEM SELECTOR PLAN 2
- reviewed CT head: no acute intracranial pathology   - reviewed CT c spine: no CT evidence of cervical spine fracture, traumatic malalignment, or suspicious osseous lesion.   - reviewed CT chest /AP: acute fractures involving bilateral clavicles and the right scapula. No pneumothorax or hemothorax, no traumatic findings in the abdomen or pelvis   - fracture management per below   - suspect fall 2/2 alcohol intoxication, on admission blood alcohol level 295--> 78  - f/u TTE - reviewed CT head: no acute intracranial pathology . Other findings: There is high parietal scalp hematoma superior to the vertex, superiorly at the vertex in the midline, asymmetric to the left.  Deformity of the anterior aspect of the nasal bones, probably chronic.  - reviewed CT c spine: no CT evidence of cervical spine fracture, traumatic malalignment, or suspicious osseous lesion.   - reviewed CT chest /AP: acute fractures involving bilateral clavicles and the right scapula. No pneumothorax or hemothorax, no traumatic findings in the abdomen or pelvis   - fracture management per below   - suspect fall 2/2 alcohol intoxication, on admission blood alcohol level 295--> 78  - personally reviewed ECG 10/2 and 10/3, unremarkable.   - monitor on telemetry   - f/u TTE  - check B12, folic acid levels, check TSH   - PT eval, non-weight bearing bilateral upper extremities

## 2024-10-03 NOTE — H&P ADULT - HISTORY OF PRESENT ILLNESS
68 yo M w/ PMH daily EtOH use, active tobacco smoker, COPD, HTN presents after falling down stairs.             Patient lives on the 2nd floor of an apartment building. He said yesterday he was going back to his room then sudden fell down one flight of stairs to the first floor. He does not remember anything prior to the fall. He remember he hit his head and his shoulders at the end of the fall. The lady who lived down stairs screamed at him and called an ambulance. After the fall he had headache and bilateral shoulder pain. He cannot provide any further details. He noted he drank one pint of vodka yesterday, and he drinks at least 1 pint of vodka every day. He said the staircase was long and narrow and no one witnessed anything prior to the fall.              ED course:  68 yo M w/ PMH daily EtOH use, active tobacco smoker, COPD, HTN presents after falling down stairs.             Patient lives on the 2nd floor of an apartment building. He said yesterday he was going back to his room then sudden fell down one flight of stairs to the first floor. He does not remember anything prior to the fall. He remember he hit his head and his shoulders at the end of the fall. The lady who lived down stairs screamed at him and called an ambulance. After the fall he had headache and bilateral shoulder pain. He cannot provide any further details. He noted he drank one pint of vodka yesterday, and he drinks at least 1 pint of vodka every day. He said the staircase was long and narrow and no one witnessed anything prior to the fall.

## 2024-10-03 NOTE — H&P ADULT - NSHPSOCIALHISTORY_GEN_ALL_CORE
smokes 1 pack of cigarettes every other day   daily alcohol use, 1 pint of vodka per day   denies other recreational drug use  lives alone   , no children smokes 1 pack of cigarettes every other day   daily alcohol use, 1 pint of vodka per day   denies other recreational drug use  lives alone . recently applied to a assisted living facility but was denied   , no children. has a brother Zoltan

## 2024-10-03 NOTE — PATIENT PROFILE ADULT - FALL HARM RISK - HARM RISK INTERVENTIONS
Assistance with ambulation/Assistance OOB with selected safe patient handling equipment/Communicate Risk of Fall with Harm to all staff/Discuss with provider need for PT consult/Monitor for mental status changes/Monitor gait and stability/Reinforce activity limits and safety measures with patient and family/Tailored Fall Risk Interventions/Toileting schedule using arm’s reach rule for commode and bathroom/Use of alarms - bed, chair and/or voice tab/Visual Cue: Yellow wristband and red socks/Bed in lowest position, wheels locked, appropriate side rails in place/Call bell, personal items and telephone in reach/Instruct patient to call for assistance before getting out of bed or chair/Non-slip footwear when patient is out of bed/[place] to call system/Physically safe environment - no spills, clutter or unnecessary equipment/Purposeful Proactive Rounding/Room/bathroom lighting operational, light cord in reach

## 2024-10-03 NOTE — H&P ADULT - PROBLEM SELECTOR PLAN 1
- currently low suspicion for alcohol withdrawal  - c/w [facility] protocol, symptom triggered   - monitor BMP Mg Phos, replete lytes PRN  - c/w multivitamin, folic acid, and thiamine supplements   - SW consult - currently low suspicion for alcohol withdrawal  - patient denies history of alcohol withdrawal   - c/w ciwa protocol, symptom triggered   - monitor BMP Mg Phos, replete lytes PRN  - c/w multivitamin, folic acid, and thiamine supplements   - SW consult

## 2024-10-03 NOTE — CONSULT NOTE ADULT - ATTENDING COMMENTS
I agree with the above note and have personally seen and examined this patient. All pertinent films have been reviewed. Please refer to clinical documentation of the history, physical examinations, data summary, and both assessment and plan as documented above and with which I agree.    XR and CT scan of shoulder - demonstrate bilateral distal clavicle fractures and non displaced scapular spine fracture - my independent interpretation    A/P: 69y Male with b/l clavicle fractures and a R scapular spine fx  - Recommend closed treatment discussed with patient in detail- discussed low risk of nonunion with closed treatement, will proceed with closed treatment of right and left clavicle fractures without manipulation and closed treatment of Right scapula fracture without manipulation  -Pain control  -NWB b/l UE in sling  -Ice  -Active movement of fingers/wrist/elbow encouraged      Gaurang Horta MD  Attending Orthopedic Surgeon

## 2024-10-03 NOTE — H&P ADULT - NSHPLABSRESULTS_GEN_ALL_CORE
11.1   13.32 )-----------( 284      ( 03 Oct 2024 03:21 )             33.9     Hgb Trend: 11.1<--, 12.7<--  10-03    140  |  106  |  13  ----------------------------<  134[H]  4.4   |  19[L]  |  0.98    Ca    9.1      03 Oct 2024 05:58  Phos  3.5     10-03  Mg     1.6     10-03    TPro  7.0  /  Alb  4.2  /  TBili  0.7  /  DBili  0.2  /  AST  66[H]  /  ALT  53[H]  /  AlkPhos  86  10-03    Creatinine Trend: 0.98<--, 1.04<--, 1.18<--  PT/INR - ( 03 Oct 2024 03:21 )   PT: 12.1 sec;   INR: 1.06 ratio         PTT - ( 03 Oct 2024 03:21 )  PTT:29.9 sec      Urinalysis Basic - ( 03 Oct 2024 05:58 )    Color: x / Appearance: x / SG: x / pH: x  Gluc: 134 mg/dL / Ketone: x  / Bili: x / Urobili: x   Blood: x / Protein: x / Nitrite: x   Leuk Esterase: x / RBC: x / WBC x   Sq Epi: x / Non Sq Epi: x / Bacteria: x 11.1   13.32 )-----------( 284      ( 03 Oct 2024 03:21 )             33.9     Hgb Trend: 11.1<--, 12.7<--  10-03    140  |  106  |  13  ----------------------------<  134[H]  4.4   |  19[L]  |  0.98    Ca    9.1      03 Oct 2024 05:58  Phos  3.5     10-03  Mg     1.6     10-03    TPro  7.0  /  Alb  4.2  /  TBili  0.7  /  DBili  0.2  /  AST  66[H]  /  ALT  53[H]  /  AlkPhos  86  10-03    Creatinine Trend: 0.98<--, 1.04<--, 1.18<--  PT/INR - ( 03 Oct 2024 03:21 )   PT: 12.1 sec;   INR: 1.06 ratio         PTT - ( 03 Oct 2024 03:21 )  PTT:29.9 sec      Urinalysis Basic - ( 03 Oct 2024 05:58 )    Color: x / Appearance: x / SG: x / pH: x  Gluc: 134 mg/dL / Ketone: x  / Bili: x / Urobili: x   Blood: x / Protein: x / Nitrite: x   Leuk Esterase: x / RBC: x / WBC x   Sq Epi: x / Non Sq Epi: x / Bacteria: x    < from: Xray Clavicle, Right (10.03.24 @ 04:41) >  Acute fracture of the distal right clavicle. Additional fracture of the   scapula better appreciated on CT exam.  < end of copied text >    < from: Xray Humerus, Left (10.02.24 @ 20:43) >  Acute comminuted fracture of the lateral clavicle.  < end of copied text >    < from: Xray Clavicle, Left (10.02.24 @ 20:43) >  Acute comminuted fracture of the lateral clavicle.  < end of copied text >    < from: Xray Shoulder 2 Views, Left (10.02.24 @ 20:42) >  Acute comminuted fracture of the lateral clavicle.  < end of copied text >      < from: CT Head No Cont (10.02.24 @ 21:26) >  IMPRESSION:  CT HEAD:  No CT evidence of acute intracranial pathology.    CT CERVICAL SPINE:  No CT evidence of cervical spine fracture, traumatic malalignment, or   suspicious osseous lesion.    Multilevel degenerative changes as discussed above.  Mild to moderate   spinal canal stenosis at C5-C6, worse on the right side.    Moderate atherosclerotic calcification of the carotid bifurcations, left   greater than right.  Carotid duplex ultrasound may be obtained as   clinically warranted to exclude a hemodynamically significant carotid   stenosis.    < from: CT Chest w/ IV Cont (10.02.24 @ 21:27) >  IMPRESSION:  Acute fractures involving bilateral clavicles and the right scapula.  No pneumothorax or hemothorax.  No traumatic findings in the abdomen or pelvis.  < end of copied text > 11.1   13.32 )-----------( 284      ( 03 Oct 2024 03:21 )             33.9     Hgb Trend: 11.1<--, 12.7<--  10-03    140  |  106  |  13  ----------------------------<  134[H]  4.4   |  19[L]  |  0.98    Ca    9.1      03 Oct 2024 05:58  Phos  3.5     10-03  Mg     1.6     10-03    TPro  7.0  /  Alb  4.2  /  TBili  0.7  /  DBili  0.2  /  AST  66[H]  /  ALT  53[H]  /  AlkPhos  86  10-03    Creatinine Trend: 0.98<--, 1.04<--, 1.18<--  PT/INR - ( 03 Oct 2024 03:21 )   PT: 12.1 sec;   INR: 1.06 ratio         PTT - ( 03 Oct 2024 03:21 )  PTT:29.9 sec      Urinalysis Basic - ( 03 Oct 2024 05:58 )    Color: x / Appearance: x / SG: x / pH: x  Gluc: 134 mg/dL / Ketone: x  / Bili: x / Urobili: x   Blood: x / Protein: x / Nitrite: x   Leuk Esterase: x / RBC: x / WBC x   Sq Epi: x / Non Sq Epi: x / Bacteria: x    < from: Xray Clavicle, Right (10.03.24 @ 04:41) >  Acute fracture of the distal right clavicle. Additional fracture of the   scapula better appreciated on CT exam.  < end of copied text >    < from: Xray Humerus, Left (10.02.24 @ 20:43) >  Acute comminuted fracture of the lateral clavicle.  < end of copied text >    < from: Xray Clavicle, Left (10.02.24 @ 20:43) >  Acute comminuted fracture of the lateral clavicle.  < end of copied text >    < from: Xray Shoulder 2 Views, Left (10.02.24 @ 20:42) >  Acute comminuted fracture of the lateral clavicle.  < end of copied text >      < from: CT Head No Cont (10.02.24 @ 21:26) >  IMPRESSION:  CT HEAD:  No CT evidence of acute intracranial pathology.    CT CERVICAL SPINE:  No CT evidence of cervical spine fracture, traumatic malalignment, or   suspicious osseous lesion.    Multilevel degenerative changes as discussed above.  Mild to moderate   spinal canal stenosis at C5-C6, worse on the right side.    Moderate atherosclerotic calcification of the carotid bifurcations, left   greater than right.  Carotid duplex ultrasound may be obtained as   clinically warranted to exclude a hemodynamically significant carotid   stenosis.    < from: CT Chest w/ IV Cont (10.02.24 @ 21:27) >  IMPRESSION:  Acute fractures involving bilateral clavicles and the right scapula.  No pneumothorax or hemothorax.  No traumatic findings in the abdomen or pelvis.  < end of copied text >    Personally reviewed ECG 10/2: normal sinus rhythm, HR 94, QTc 445, no ST elevation or ST depression   Personally reviewed ECG 10/3: normal sinus rhythm, , QTc 447. no ST elevation or ST depression

## 2024-10-03 NOTE — H&P ADULT - PROBLEM SELECTOR PLAN 5
labs noted leukocytosis 19--> 15, suspect reactive in setting of alcohol intoxication/fall /shoulder fractures, low suspicion for active infection   Labs notable for AST/ALT elevation, suspect 2/2 alcohol use, continue to trend  Lactate 4.3--> 3.7, c/w fluid resuscitation , followup repeat lactate     - fall precautions   - seizure precautions   - DASH Diet   - overall upper extremities fracture carry lower risk of VTE   - Given recent fall 10/2 and parietal scalp hematoma, will hold chemical DVT prophylaxis for today; consider start chemical DVT prophylaxis tomorrow.

## 2024-10-03 NOTE — H&P ADULT - ASSESSMENT
68 yo M w/ PMH daily EtOH use, active tobacco smoker, COPD, HTN presents after falling down stairs.

## 2024-10-03 NOTE — H&P ADULT - PROBLEM SELECTOR PLAN 4
- home regimen: roflumilast 500mcg daily, trelergy Ellipta (fluticasone furoate /umeclidinium/vlanterol 200mcg/62.5mcg/25mcg) 1 inhalation per day  - hospital pharmacy don't carry roflumilast ---> advise patient to have family/friend bring it from home   - therapeutic equivalent for trerocio Ellianta ---> Advair + Spiriva

## 2024-10-03 NOTE — SBIRT NOTE ADULT - NSSBIRTBRIEFINTDET_GEN_A_CORE
LMSW met with patient at bedside to complete SBIRT screen. As per patient, he went to inpatient substance use treatment at [place] in the 90's for 4 months. Patient reports that he was sober for 10 years and then relapsed. Patient reports that he is motivated to reduce his alcohol intake and obtain stable housing. LMSW provided positive reinforcement and brief intervention to review patient's goals.

## 2024-10-03 NOTE — H&P ADULT - NSHPPHYSICALEXAM_GEN_ALL_CORE
Vital Signs Last 24 Hrs  T(C): 36.7 (03 Oct 2024 12:45), Max: 36.9 (03 Oct 2024 07:41)  T(F): 98 (03 Oct 2024 12:45), Max: 98.4 (03 Oct 2024 07:41)  HR: 83 (03 Oct 2024 12:45) (83 - 107)  BP: 173/84 (03 Oct 2024 12:45) (134/107 - 183/102)  BP(mean): 96 (02 Oct 2024 22:20) (96 - 96)  RR: 18 (03 Oct 2024 12:45) (18 - 25)  SpO2: 95% (03 Oct 2024 12:45) (95% - 98%)    Parameters below as of 03 Oct 2024 12:45  Patient On (Oxygen Delivery Method): room air Vital Signs Last 24 Hrs  T(C): 36.7 (03 Oct 2024 12:45), Max: 36.9 (03 Oct 2024 07:41)  T(F): 98 (03 Oct 2024 12:45), Max: 98.4 (03 Oct 2024 07:41)  HR: 83 (03 Oct 2024 12:45) (83 - 107)  BP: 173/84 (03 Oct 2024 12:45) (134/107 - 183/102)  BP(mean): 96 (02 Oct 2024 22:20) (96 - 96)  RR: 18 (03 Oct 2024 12:45) (18 - 25)  SpO2: 95% (03 Oct 2024 12:45) (95% - 98%)    Parameters below as of 03 Oct 2024 12:45  Patient On (Oxygen Delivery Method): room air    General: lying in bed, in no acute distress   CV: regular rate/rhythm, no murmurs/rubs/gallops   Resp: normal respiratory effort, no wheezes/crackles on auscultation   Abd: soft, nondistended, nontender to palpation, normal active bowel sounds  MSK: shoulder in sling, + erythema at shoulder. bilateral shoulders tender on palpation   Neuro: alert, oriented x3, no tremor, no tongue fasciculation, follows verbal commands

## 2024-10-03 NOTE — H&P ADULT - PROBLEM SELECTOR PLAN 3
- CT noted Comminuted fracture of the left lateral clavicle. Additional minimally displaced fracture of the right lateral clavicle. Acute fracture of the right scapular spine. Chronic appearing fracture of the right L2 transverse process.  - s/p orthopedic team eval, rec: -NWB b/l UE in sling                                                                -Ice                                                               -Active movement of fingers/wrist/elbow encouraged                                                               -Pt poor operative candidate due to hx of alcohol misuse syndrome                                                               -Non operative management at this time                                                               -Outpt follow up  - c/w pain management

## 2024-10-04 LAB
ALBUMIN SERPL ELPH-MCNC: 4 G/DL — SIGNIFICANT CHANGE UP (ref 3.3–5)
ALP SERPL-CCNC: 95 U/L — SIGNIFICANT CHANGE UP (ref 40–120)
ALT FLD-CCNC: 42 U/L — SIGNIFICANT CHANGE UP (ref 10–45)
ANION GAP SERPL CALC-SCNC: 14 MMOL/L — SIGNIFICANT CHANGE UP (ref 5–17)
AST SERPL-CCNC: 55 U/L — HIGH (ref 10–40)
BASOPHILS # BLD AUTO: 0.09 K/UL — SIGNIFICANT CHANGE UP (ref 0–0.2)
BASOPHILS NFR BLD AUTO: 1 % — SIGNIFICANT CHANGE UP (ref 0–2)
BILIRUB SERPL-MCNC: 1.2 MG/DL — SIGNIFICANT CHANGE UP (ref 0.2–1.2)
BUN SERPL-MCNC: 10 MG/DL — SIGNIFICANT CHANGE UP (ref 7–23)
CALCIUM SERPL-MCNC: 9.6 MG/DL — SIGNIFICANT CHANGE UP (ref 8.4–10.5)
CHLORIDE SERPL-SCNC: 100 MMOL/L — SIGNIFICANT CHANGE UP (ref 96–108)
CO2 SERPL-SCNC: 24 MMOL/L — SIGNIFICANT CHANGE UP (ref 22–31)
CREAT SERPL-MCNC: 0.86 MG/DL — SIGNIFICANT CHANGE UP (ref 0.5–1.3)
EGFR: 95 ML/MIN/1.73M2 — SIGNIFICANT CHANGE UP
EOSINOPHIL # BLD AUTO: 0.09 K/UL — SIGNIFICANT CHANGE UP (ref 0–0.5)
EOSINOPHIL NFR BLD AUTO: 1 % — SIGNIFICANT CHANGE UP (ref 0–6)
FOLATE SERPL-MCNC: 6.5 NG/ML — SIGNIFICANT CHANGE UP
GLUCOSE SERPL-MCNC: 114 MG/DL — HIGH (ref 70–99)
HCT VFR BLD CALC: 35.3 % — LOW (ref 39–50)
HGB BLD-MCNC: 11.1 G/DL — LOW (ref 13–17)
IMM GRANULOCYTES NFR BLD AUTO: 0.3 % — SIGNIFICANT CHANGE UP (ref 0–0.9)
LYMPHOCYTES # BLD AUTO: 2.47 K/UL — SIGNIFICANT CHANGE UP (ref 1–3.3)
LYMPHOCYTES # BLD AUTO: 26.7 % — SIGNIFICANT CHANGE UP (ref 13–44)
MAGNESIUM SERPL-MCNC: 2 MG/DL — SIGNIFICANT CHANGE UP (ref 1.6–2.6)
MCHC RBC-ENTMCNC: 20.1 PG — LOW (ref 27–34)
MCHC RBC-ENTMCNC: 31.4 GM/DL — LOW (ref 32–36)
MCV RBC AUTO: 63.9 FL — LOW (ref 80–100)
MONOCYTES # BLD AUTO: 0.97 K/UL — HIGH (ref 0–0.9)
MONOCYTES NFR BLD AUTO: 10.5 % — SIGNIFICANT CHANGE UP (ref 2–14)
NEUTROPHILS # BLD AUTO: 5.61 K/UL — SIGNIFICANT CHANGE UP (ref 1.8–7.4)
NEUTROPHILS NFR BLD AUTO: 60.5 % — SIGNIFICANT CHANGE UP (ref 43–77)
NRBC # BLD: 0 /100 WBCS — SIGNIFICANT CHANGE UP (ref 0–0)
PHOSPHATE SERPL-MCNC: 2.7 MG/DL — SIGNIFICANT CHANGE UP (ref 2.5–4.5)
PLATELET # BLD AUTO: 241 K/UL — SIGNIFICANT CHANGE UP (ref 150–400)
POTASSIUM SERPL-MCNC: 3.8 MMOL/L — SIGNIFICANT CHANGE UP (ref 3.5–5.3)
POTASSIUM SERPL-SCNC: 3.8 MMOL/L — SIGNIFICANT CHANGE UP (ref 3.5–5.3)
PROT SERPL-MCNC: 6.9 G/DL — SIGNIFICANT CHANGE UP (ref 6–8.3)
RBC # BLD: 5.52 M/UL — SIGNIFICANT CHANGE UP (ref 4.2–5.8)
RBC # FLD: 15.5 % — HIGH (ref 10.3–14.5)
SODIUM SERPL-SCNC: 138 MMOL/L — SIGNIFICANT CHANGE UP (ref 135–145)
TSH SERPL-MCNC: 0.69 UIU/ML — SIGNIFICANT CHANGE UP (ref 0.27–4.2)
VIT B12 SERPL-MCNC: 458 PG/ML — SIGNIFICANT CHANGE UP (ref 232–1245)
WBC # BLD: 9.26 K/UL — SIGNIFICANT CHANGE UP (ref 3.8–10.5)
WBC # FLD AUTO: 9.26 K/UL — SIGNIFICANT CHANGE UP (ref 3.8–10.5)

## 2024-10-04 PROCEDURE — 99232 SBSQ HOSP IP/OBS MODERATE 35: CPT

## 2024-10-04 PROCEDURE — 93306 TTE W/DOPPLER COMPLETE: CPT | Mod: 26

## 2024-10-04 RX ORDER — MORPHINE SULFATE 30 MG/1
2 TABLET, FILM COATED, EXTENDED RELEASE ORAL ONCE
Refills: 0 | Status: DISCONTINUED | OUTPATIENT
Start: 2024-10-04 | End: 2024-10-04

## 2024-10-04 RX ADMIN — Medication 650 MILLIGRAM(S): at 08:20

## 2024-10-04 RX ADMIN — BENZOCAINE AND LEVOMENTHOL 1 LOZENGE: 200; 5 SPRAY TOPICAL at 22:49

## 2024-10-04 RX ADMIN — MULTI VITAMIN/MINERAL SUPPLEMENT WITH ASCORBIC ACID, NIACIN, PYRIDOXINE, PANTOTHENIC ACID, FOLIC ACID, RIBOFLAVIN, THIAMIN, BIOTIN, COBALAMIN AND ZINC. 1 TABLET(S): 60; 20; 12.5; 10; 10; 1.7; 1.5; 1; .3; .006 TABLET, COATED ORAL at 11:12

## 2024-10-04 RX ADMIN — TIOTROPIUM BROMIDE INHALATION SPRAY 2 PUFF(S): 3.12 SPRAY, METERED RESPIRATORY (INHALATION) at 14:03

## 2024-10-04 RX ADMIN — Medication 1 DOSE(S): at 04:41

## 2024-10-04 RX ADMIN — ATORVASTATIN CALCIUM 40 MILLIGRAM(S): 10 TABLET, FILM COATED ORAL at 22:48

## 2024-10-04 RX ADMIN — Medication 650 MILLIGRAM(S): at 14:33

## 2024-10-04 RX ADMIN — Medication 10 MILLIGRAM(S): at 04:38

## 2024-10-04 RX ADMIN — THIAMINE HYDROCHLORIDE 100 MILLIGRAM(S): 100 INJECTION, SOLUTION INTRAMUSCULAR; INTRAVENOUS at 11:13

## 2024-10-04 RX ADMIN — FOLIC ACID 1 MILLIGRAM(S): 1 TABLET ORAL at 11:13

## 2024-10-04 RX ADMIN — Medication 650 MILLIGRAM(S): at 22:48

## 2024-10-04 RX ADMIN — OXYCODONE HYDROCHLORIDE 5 MILLIGRAM(S): 30 TABLET, FILM COATED, EXTENDED RELEASE ORAL at 11:42

## 2024-10-04 RX ADMIN — Medication 650 MILLIGRAM(S): at 07:51

## 2024-10-04 RX ADMIN — Medication 650 MILLIGRAM(S): at 14:03

## 2024-10-04 RX ADMIN — Medication 650 MILLIGRAM(S): at 23:30

## 2024-10-04 RX ADMIN — OXYCODONE HYDROCHLORIDE 5 MILLIGRAM(S): 30 TABLET, FILM COATED, EXTENDED RELEASE ORAL at 04:38

## 2024-10-04 RX ADMIN — OXYCODONE HYDROCHLORIDE 5 MILLIGRAM(S): 30 TABLET, FILM COATED, EXTENDED RELEASE ORAL at 11:12

## 2024-10-04 RX ADMIN — OXYCODONE HYDROCHLORIDE 5 MILLIGRAM(S): 30 TABLET, FILM COATED, EXTENDED RELEASE ORAL at 17:34

## 2024-10-04 RX ADMIN — MORPHINE SULFATE 2 MILLIGRAM(S): 30 TABLET, FILM COATED, EXTENDED RELEASE ORAL at 01:11

## 2024-10-04 RX ADMIN — Medication 1 DOSE(S): at 17:34

## 2024-10-04 NOTE — PHYSICAL THERAPY INITIAL EVALUATION ADULT - PERTINENT HX OF CURRENT PROBLEM, REHAB EVAL
70 yo M w/ PMH daily EtOH use, active tobacco smoker, COPD, HTN presents after falling down stairs.  Patient lives on the 2nd floor of an apartment building. He said yesterday he was going back to his room then sudden fell down one flight of stairs to the first floor. He does not remember anything prior to the fall. He remember he hit his head and his shoulders at the end of the fall. The lady who lived down stairs screamed at him and called an ambulance. After the fall he had headache and bilateral shoulder pain. He cannot provide any further details. He noted he drank one pint of vodka yesterday, and he drinks at least 1 pint of vodka every day. He said the staircase was long and narrow and no one witnessed anything prior to the fall. Imaging from 10/2 as follows: XRAY PELVIS: (-). CXR: Left midlung linear atelectasis.  No pneumothorax. XRAY SHOULDER/CLAVICLE/HUMERUS L: Acute comminuted fracture of the lateral clavicle. CT HEAD: No CT evidence of acute intracranial pathology. CT CERVICAL SPINE: No CT evidence of cervical spine fracture, traumatic malalignment, or suspicious osseous lesion. Multilevel degenerative changes as discussed above.  Mild to moderate spinal canal stenosis at C5-C6, worse on the right side. Moderate atherosclerotic calcification of the carotid bifurcations, left greater than right. CT CHEST: Acute fractures involving bilateral clavicles and the right scapula. XRAY CLAVICLE R: Acute fx of distal R clavicle. Additional fx of scapula.

## 2024-10-04 NOTE — DIETITIAN INITIAL EVALUATION ADULT - PERTINENT LABORATORY DATA
10-04    138  |  100  |  10  ----------------------------<  114[H]  3.8   |  24  |  0.86    Ca    9.6      04 Oct 2024 07:03  Phos  2.7     10-04  Mg     2.0     10-04    TPro  6.9  /  Alb  4.0  /  TBili  1.2  /  DBili  x   /  AST  55[H]  /  ALT  42  /  AlkPhos  95  10-04

## 2024-10-04 NOTE — DIETITIAN INITIAL EVALUATION ADULT - OTHER INFO
Patient reports UBW 210lb, reports no recent changes in weight PTA. Patient reports height is 5'6.  No dosing height or weight.  RD obtained current weight: 195lb.  IBW: 142lb, %IBW: 137% based on RD obtained current weight.   Weight [facility] per [facility]: 214.9lb (7/11/24), 214lb (6/26/24), 205lb (12/2/23), 220lb (6/15/23), 211lb (9/20/22).  Weight appears stable PTA, decreasing vs RD obtained current weight. RD to continue to monitor weight trends as available/able.     -Hx daily EtOH use per chart. Ordered for Multivitamin, folic acid, thiamine.  -Hx COPD per chart.

## 2024-10-04 NOTE — PHYSICAL THERAPY INITIAL EVALUATION ADULT - PLANNED THERAPY INTERVENTIONS, PT EVAL
STAIR GOAL: Pt will negotiate 15 stairs independent in 2 wks to improve overall functional mobility./balance training/bed mobility training/gait training/strengthening/transfer training

## 2024-10-04 NOTE — DIETITIAN INITIAL EVALUATION ADULT - PROBLEM SELECTOR PLAN 2
- reviewed CT head: no acute intracranial pathology . Other findings: There is high parietal scalp hematoma superior to the vertex, superiorly at the vertex in the midline, asymmetric to the left.  Deformity of the anterior aspect of the nasal bones, probably chronic.  - reviewed CT c spine: no CT evidence of cervical spine fracture, traumatic malalignment, or suspicious osseous lesion.   - reviewed CT chest /AP: acute fractures involving bilateral clavicles and the right scapula. No pneumothorax or hemothorax, no traumatic findings in the abdomen or pelvis   - fracture management per below   - suspect fall 2/2 alcohol intoxication, on admission blood alcohol level 295--> 78  - personally reviewed ECG 10/2 and 10/3, unremarkable.   - monitor on telemetry   - f/u TTE  - check B12, folic acid levels, check TSH   - PT eval, non-weight bearing bilateral upper extremities

## 2024-10-04 NOTE — DIETITIAN INITIAL EVALUATION ADULT - PERTINENT MEDS FT
MEDICATIONS  (STANDING):  atorvastatin 40 milliGRAM(s) Oral at bedtime  fluticasone propionate/ salmeterol 250-50 MICROgram(s) Diskus 1 Dose(s) Inhalation two times a day  folic acid 1 milliGRAM(s) Oral daily  influenza  Vaccine (HIGH DOSE) 0.5 milliLiter(s) IntraMuscular once  lisinopril 10 milliGRAM(s) Oral daily  multivitamin 1 Tablet(s) Oral daily  thiamine Injectable 100 milliGRAM(s) IV Push daily  tiotropium 2.5 MICROgram(s) Inhaler 2 Puff(s) Inhalation daily    MEDICATIONS  (PRN):  acetaminophen     Tablet .. 650 milliGRAM(s) Oral every 6 hours PRN Mild Pain (1 - 3)  benzocaine/menthol Lozenge 1 Lozenge Oral three times a day PRN Sore Throat  LORazepam   Injectable 1 milliGRAM(s) IV Push every 1 hour PRN CIWA-Ar score 8 or greater  ondansetron   Disintegrating Tablet 4 milliGRAM(s) Oral every 8 hours PRN Nausea and/or Vomiting  oxyCODONE    IR 5 milliGRAM(s) Oral every 6 hours PRN Severe Pain (7 - 10)

## 2024-10-04 NOTE — PHYSICAL THERAPY INITIAL EVALUATION ADULT - GAIT DEVIATIONS NOTED, PT EVAL
decreased montserrat/decreased step length/decreased stride length/decreased weight-shifting ability

## 2024-10-04 NOTE — DIETITIAN INITIAL EVALUATION ADULT - ADD RECOMMEND
-Continue DASH diet.  -Add Ensure Plus High Protein x 1/day (provides 350 kcal, 20 g protein).  -Continue Multivitamin, thiamine, folic acid pending no medical contraindications.  -Monitor PO intake, diet, weight, labs, skin, GI symptoms, and BM regularity.  -RD remains available upon request and will follow up per protocol.

## 2024-10-04 NOTE — DIETITIAN INITIAL EVALUATION ADULT - DIET TYPE
Ensure Plus High Protein x 1/day (provides 350 kcal, 20 g protein)/DASH/TLC (sodium and cholesterol restricted diet)/supplement (specify)

## 2024-10-04 NOTE — PHYSICAL THERAPY INITIAL EVALUATION ADULT - NSPTDISCHREC_GEN_A_CORE
if return home; pt will need homePT; assist w/ all ADL's; transport into home; shower chair; raised toilet seat/[facility]

## 2024-10-04 NOTE — PHYSICAL THERAPY INITIAL EVALUATION ADULT - ACTIVE RANGE OF MOTION EXAMINATION, REHAB EVAL
Strep A sent to lab. Lab called and stated that they had no order, reordered. except B/L shoulder ROM not tested/bilateral lower extremity Active ROM was WNL (within normal limits)/bilateral upper extremity Active ROM was WFL (within functional limits)

## 2024-10-04 NOTE — PHYSICAL THERAPY INITIAL EVALUATION ADULT - ADDITIONAL COMMENTS
Pt lives in 2nd floor apartment w/ 15 steps to enter and no steps once inside. Pt lives alone. Pt independent w/ ambulation and ADL's PTA. pt owns no DME.

## 2024-10-04 NOTE — DIETITIAN INITIAL EVALUATION ADULT - PERSON TAUGHT/METHOD
Provided recommendations to optimize PO and protein intake, recommended small frequent meals by ordering nutrient-dense snacks and leaving non-perishable food away from tray for later consumption during the day or between meals, to start with protein, and sips of supplement throughout the day; reviewed foods with protein and menu order procedures in hospital. Patient without specific food preferences for RD at this time. Patient made aware RD to remain available./verbal instruction/patient instructed

## 2024-10-04 NOTE — PHYSICAL THERAPY INITIAL EVALUATION ADULT - BALANCE TRAINING, PT EVAL
Pt will perform sitting and standing dynamic tasks w/ good balance in 2wks to improve overall functional mobility.

## 2024-10-04 NOTE — DIETITIAN INITIAL EVALUATION ADULT - ENERGY INTAKE
Decreased appetite, was not hungry yesterday, notes slight improvement today. RD observed lunch tray with poor PO intake. Poor (<50%)

## 2024-10-04 NOTE — DIETITIAN INITIAL EVALUATION ADULT - PROBLEM SELECTOR PLAN 1
- currently low suspicion for alcohol withdrawal  - patient denies history of alcohol withdrawal   - c/w ciwa protocol, symptom triggered   - monitor BMP Mg Phos, replete lytes PRN  - c/w multivitamin, folic acid, and thiamine supplements   - SW consult

## 2024-10-04 NOTE — PHYSICAL THERAPY INITIAL EVALUATION ADULT - DIAGNOSIS, PT EVAL
Pt presents w/ functional deficits that include strength and pain that limit pt's functional mobility

## 2024-10-05 LAB
ALBUMIN SERPL ELPH-MCNC: 3.9 G/DL — SIGNIFICANT CHANGE UP (ref 3.3–5)
ALP SERPL-CCNC: 90 U/L — SIGNIFICANT CHANGE UP (ref 40–120)
ALT FLD-CCNC: 38 U/L — SIGNIFICANT CHANGE UP (ref 10–45)
ANION GAP SERPL CALC-SCNC: 14 MMOL/L — SIGNIFICANT CHANGE UP (ref 5–17)
AST SERPL-CCNC: 43 U/L — HIGH (ref 10–40)
BILIRUB SERPL-MCNC: 0.9 MG/DL — SIGNIFICANT CHANGE UP (ref 0.2–1.2)
BUN SERPL-MCNC: 11 MG/DL — SIGNIFICANT CHANGE UP (ref 7–23)
CALCIUM SERPL-MCNC: 9.6 MG/DL — SIGNIFICANT CHANGE UP (ref 8.4–10.5)
CHLORIDE SERPL-SCNC: 98 MMOL/L — SIGNIFICANT CHANGE UP (ref 96–108)
CO2 SERPL-SCNC: 25 MMOL/L — SIGNIFICANT CHANGE UP (ref 22–31)
CREAT SERPL-MCNC: 0.9 MG/DL — SIGNIFICANT CHANGE UP (ref 0.5–1.3)
EGFR: 94 ML/MIN/1.73M2 — SIGNIFICANT CHANGE UP
GLUCOSE SERPL-MCNC: 100 MG/DL — HIGH (ref 70–99)
POTASSIUM SERPL-MCNC: 3.7 MMOL/L — SIGNIFICANT CHANGE UP (ref 3.5–5.3)
POTASSIUM SERPL-SCNC: 3.7 MMOL/L — SIGNIFICANT CHANGE UP (ref 3.5–5.3)
PROT SERPL-MCNC: 7.3 G/DL — SIGNIFICANT CHANGE UP (ref 6–8.3)
SODIUM SERPL-SCNC: 137 MMOL/L — SIGNIFICANT CHANGE UP (ref 135–145)

## 2024-10-05 PROCEDURE — 99232 SBSQ HOSP IP/OBS MODERATE 35: CPT

## 2024-10-05 RX ADMIN — Medication 1 DOSE(S): at 05:19

## 2024-10-05 RX ADMIN — Medication 650 MILLIGRAM(S): at 06:10

## 2024-10-05 RX ADMIN — Medication 1 DOSE(S): at 17:24

## 2024-10-05 RX ADMIN — TIOTROPIUM BROMIDE INHALATION SPRAY 2 PUFF(S): 3.12 SPRAY, METERED RESPIRATORY (INHALATION) at 12:17

## 2024-10-05 RX ADMIN — Medication 17 GRAM(S): at 12:17

## 2024-10-05 RX ADMIN — OXYCODONE HYDROCHLORIDE 5 MILLIGRAM(S): 30 TABLET, FILM COATED, EXTENDED RELEASE ORAL at 12:53

## 2024-10-05 RX ADMIN — Medication 10 MILLIGRAM(S): at 05:15

## 2024-10-05 RX ADMIN — MULTI VITAMIN/MINERAL SUPPLEMENT WITH ASCORBIC ACID, NIACIN, PYRIDOXINE, PANTOTHENIC ACID, FOLIC ACID, RIBOFLAVIN, THIAMIN, BIOTIN, COBALAMIN AND ZINC. 1 TABLET(S): 60; 20; 12.5; 10; 10; 1.7; 1.5; 1; .3; .006 TABLET, COATED ORAL at 12:16

## 2024-10-05 RX ADMIN — OXYCODONE HYDROCHLORIDE 5 MILLIGRAM(S): 30 TABLET, FILM COATED, EXTENDED RELEASE ORAL at 12:23

## 2024-10-05 RX ADMIN — FOLIC ACID 1 MILLIGRAM(S): 1 TABLET ORAL at 12:16

## 2024-10-05 RX ADMIN — ATORVASTATIN CALCIUM 40 MILLIGRAM(S): 10 TABLET, FILM COATED ORAL at 21:46

## 2024-10-05 RX ADMIN — THIAMINE HYDROCHLORIDE 100 MILLIGRAM(S): 100 INJECTION, SOLUTION INTRAMUSCULAR; INTRAVENOUS at 12:16

## 2024-10-05 RX ADMIN — Medication 650 MILLIGRAM(S): at 05:20

## 2024-10-06 LAB
ALBUMIN SERPL ELPH-MCNC: 3.7 G/DL — SIGNIFICANT CHANGE UP (ref 3.3–5)
ALP SERPL-CCNC: 92 U/L — SIGNIFICANT CHANGE UP (ref 40–120)
ALT FLD-CCNC: 36 U/L — SIGNIFICANT CHANGE UP (ref 10–45)
ANION GAP SERPL CALC-SCNC: 14 MMOL/L — SIGNIFICANT CHANGE UP (ref 5–17)
AST SERPL-CCNC: 32 U/L — SIGNIFICANT CHANGE UP (ref 10–40)
BILIRUB SERPL-MCNC: 1 MG/DL — SIGNIFICANT CHANGE UP (ref 0.2–1.2)
BUN SERPL-MCNC: 13 MG/DL — SIGNIFICANT CHANGE UP (ref 7–23)
CALCIUM SERPL-MCNC: 9.7 MG/DL — SIGNIFICANT CHANGE UP (ref 8.4–10.5)
CHLORIDE SERPL-SCNC: 100 MMOL/L — SIGNIFICANT CHANGE UP (ref 96–108)
CO2 SERPL-SCNC: 24 MMOL/L — SIGNIFICANT CHANGE UP (ref 22–31)
CREAT SERPL-MCNC: 0.87 MG/DL — SIGNIFICANT CHANGE UP (ref 0.5–1.3)
EGFR: 95 ML/MIN/1.73M2 — SIGNIFICANT CHANGE UP
GLUCOSE SERPL-MCNC: 107 MG/DL — HIGH (ref 70–99)
POTASSIUM SERPL-MCNC: 3.9 MMOL/L — SIGNIFICANT CHANGE UP (ref 3.5–5.3)
POTASSIUM SERPL-SCNC: 3.9 MMOL/L — SIGNIFICANT CHANGE UP (ref 3.5–5.3)
PROT SERPL-MCNC: 7.1 G/DL — SIGNIFICANT CHANGE UP (ref 6–8.3)
SODIUM SERPL-SCNC: 138 MMOL/L — SIGNIFICANT CHANGE UP (ref 135–145)

## 2024-10-06 PROCEDURE — 99232 SBSQ HOSP IP/OBS MODERATE 35: CPT

## 2024-10-06 RX ORDER — 5-HYDROXYTRYPTOPHAN (5-HTP) 100 MG
5 TABLET,DISINTEGRATING ORAL AT BEDTIME
Refills: 0 | Status: COMPLETED | OUTPATIENT
Start: 2024-10-06 | End: 2024-10-08

## 2024-10-06 RX ORDER — 5-HYDROXYTRYPTOPHAN (5-HTP) 100 MG
5 TABLET,DISINTEGRATING ORAL ONCE
Refills: 0 | Status: COMPLETED | OUTPATIENT
Start: 2024-10-06 | End: 2024-10-06

## 2024-10-06 RX ORDER — 5-HYDROXYTRYPTOPHAN (5-HTP) 100 MG
5 TABLET,DISINTEGRATING ORAL ONCE
Refills: 0 | Status: DISCONTINUED | OUTPATIENT
Start: 2024-10-06 | End: 2024-10-10

## 2024-10-06 RX ADMIN — Medication 1 DOSE(S): at 06:20

## 2024-10-06 RX ADMIN — Medication 650 MILLIGRAM(S): at 01:35

## 2024-10-06 RX ADMIN — MULTI VITAMIN/MINERAL SUPPLEMENT WITH ASCORBIC ACID, NIACIN, PYRIDOXINE, PANTOTHENIC ACID, FOLIC ACID, RIBOFLAVIN, THIAMIN, BIOTIN, COBALAMIN AND ZINC. 1 TABLET(S): 60; 20; 12.5; 10; 10; 1.7; 1.5; 1; .3; .006 TABLET, COATED ORAL at 11:17

## 2024-10-06 RX ADMIN — FOLIC ACID 1 MILLIGRAM(S): 1 TABLET ORAL at 11:17

## 2024-10-06 RX ADMIN — OXYCODONE HYDROCHLORIDE 5 MILLIGRAM(S): 30 TABLET, FILM COATED, EXTENDED RELEASE ORAL at 06:56

## 2024-10-06 RX ADMIN — THIAMINE HYDROCHLORIDE 100 MILLIGRAM(S): 100 INJECTION, SOLUTION INTRAMUSCULAR; INTRAVENOUS at 11:16

## 2024-10-06 RX ADMIN — Medication 17 GRAM(S): at 11:16

## 2024-10-06 RX ADMIN — Medication 1 DOSE(S): at 17:05

## 2024-10-06 RX ADMIN — Medication 20 MILLIGRAM(S): at 06:20

## 2024-10-06 RX ADMIN — OXYCODONE HYDROCHLORIDE 5 MILLIGRAM(S): 30 TABLET, FILM COATED, EXTENDED RELEASE ORAL at 06:26

## 2024-10-06 RX ADMIN — TIOTROPIUM BROMIDE INHALATION SPRAY 2 PUFF(S): 3.12 SPRAY, METERED RESPIRATORY (INHALATION) at 11:17

## 2024-10-06 RX ADMIN — Medication 5 MILLIGRAM(S): at 01:37

## 2024-10-06 RX ADMIN — ATORVASTATIN CALCIUM 40 MILLIGRAM(S): 10 TABLET, FILM COATED ORAL at 21:36

## 2024-10-06 RX ADMIN — Medication 5 MILLIGRAM(S): at 21:36

## 2024-10-07 PROCEDURE — 99232 SBSQ HOSP IP/OBS MODERATE 35: CPT

## 2024-10-07 RX ADMIN — FOLIC ACID 1 MILLIGRAM(S): 1 TABLET ORAL at 12:01

## 2024-10-07 RX ADMIN — MULTI VITAMIN/MINERAL SUPPLEMENT WITH ASCORBIC ACID, NIACIN, PYRIDOXINE, PANTOTHENIC ACID, FOLIC ACID, RIBOFLAVIN, THIAMIN, BIOTIN, COBALAMIN AND ZINC. 1 TABLET(S): 60; 20; 12.5; 10; 10; 1.7; 1.5; 1; .3; .006 TABLET, COATED ORAL at 12:01

## 2024-10-07 RX ADMIN — Medication 5 MILLIGRAM(S): at 22:10

## 2024-10-07 RX ADMIN — Medication 1 DOSE(S): at 05:59

## 2024-10-07 RX ADMIN — ATORVASTATIN CALCIUM 40 MILLIGRAM(S): 10 TABLET, FILM COATED ORAL at 22:09

## 2024-10-07 RX ADMIN — Medication 17 GRAM(S): at 12:01

## 2024-10-07 RX ADMIN — Medication 20 MILLIGRAM(S): at 05:59

## 2024-10-07 RX ADMIN — OXYCODONE HYDROCHLORIDE 5 MILLIGRAM(S): 30 TABLET, FILM COATED, EXTENDED RELEASE ORAL at 16:43

## 2024-10-07 RX ADMIN — OXYCODONE HYDROCHLORIDE 5 MILLIGRAM(S): 30 TABLET, FILM COATED, EXTENDED RELEASE ORAL at 06:04

## 2024-10-07 RX ADMIN — TIOTROPIUM BROMIDE INHALATION SPRAY 2 PUFF(S): 3.12 SPRAY, METERED RESPIRATORY (INHALATION) at 12:02

## 2024-10-08 PROCEDURE — 99232 SBSQ HOSP IP/OBS MODERATE 35: CPT

## 2024-10-08 RX ADMIN — OXYCODONE HYDROCHLORIDE 5 MILLIGRAM(S): 30 TABLET, FILM COATED, EXTENDED RELEASE ORAL at 20:30

## 2024-10-08 RX ADMIN — ATORVASTATIN CALCIUM 40 MILLIGRAM(S): 10 TABLET, FILM COATED ORAL at 21:34

## 2024-10-08 RX ADMIN — OXYCODONE HYDROCHLORIDE 5 MILLIGRAM(S): 30 TABLET, FILM COATED, EXTENDED RELEASE ORAL at 21:00

## 2024-10-08 RX ADMIN — Medication 17 GRAM(S): at 11:07

## 2024-10-08 RX ADMIN — OXYCODONE HYDROCHLORIDE 5 MILLIGRAM(S): 30 TABLET, FILM COATED, EXTENDED RELEASE ORAL at 08:14

## 2024-10-08 RX ADMIN — MULTI VITAMIN/MINERAL SUPPLEMENT WITH ASCORBIC ACID, NIACIN, PYRIDOXINE, PANTOTHENIC ACID, FOLIC ACID, RIBOFLAVIN, THIAMIN, BIOTIN, COBALAMIN AND ZINC. 1 TABLET(S): 60; 20; 12.5; 10; 10; 1.7; 1.5; 1; .3; .006 TABLET, COATED ORAL at 11:07

## 2024-10-08 RX ADMIN — Medication 1 DOSE(S): at 17:33

## 2024-10-08 RX ADMIN — TIOTROPIUM BROMIDE INHALATION SPRAY 2 PUFF(S): 3.12 SPRAY, METERED RESPIRATORY (INHALATION) at 11:07

## 2024-10-08 RX ADMIN — FOLIC ACID 1 MILLIGRAM(S): 1 TABLET ORAL at 11:07

## 2024-10-08 RX ADMIN — Medication 5 MILLIGRAM(S): at 21:34

## 2024-10-08 RX ADMIN — OXYCODONE HYDROCHLORIDE 5 MILLIGRAM(S): 30 TABLET, FILM COATED, EXTENDED RELEASE ORAL at 09:19

## 2024-10-08 RX ADMIN — Medication 1 DOSE(S): at 06:09

## 2024-10-08 RX ADMIN — Medication 20 MILLIGRAM(S): at 06:08

## 2024-10-09 PROCEDURE — 99232 SBSQ HOSP IP/OBS MODERATE 35: CPT

## 2024-10-09 RX ADMIN — OXYCODONE HYDROCHLORIDE 5 MILLIGRAM(S): 30 TABLET, FILM COATED, EXTENDED RELEASE ORAL at 10:03

## 2024-10-09 RX ADMIN — OXYCODONE HYDROCHLORIDE 5 MILLIGRAM(S): 30 TABLET, FILM COATED, EXTENDED RELEASE ORAL at 11:00

## 2024-10-09 RX ADMIN — Medication 1 DOSE(S): at 17:07

## 2024-10-09 RX ADMIN — ATORVASTATIN CALCIUM 40 MILLIGRAM(S): 10 TABLET, FILM COATED ORAL at 23:28

## 2024-10-09 RX ADMIN — TIOTROPIUM BROMIDE INHALATION SPRAY 2 PUFF(S): 3.12 SPRAY, METERED RESPIRATORY (INHALATION) at 11:41

## 2024-10-09 RX ADMIN — Medication 1 DOSE(S): at 06:30

## 2024-10-09 RX ADMIN — Medication 20 MILLIGRAM(S): at 06:30

## 2024-10-09 RX ADMIN — OXYCODONE HYDROCHLORIDE 5 MILLIGRAM(S): 30 TABLET, FILM COATED, EXTENDED RELEASE ORAL at 17:07

## 2024-10-09 RX ADMIN — FOLIC ACID 1 MILLIGRAM(S): 1 TABLET ORAL at 11:41

## 2024-10-09 RX ADMIN — MULTI VITAMIN/MINERAL SUPPLEMENT WITH ASCORBIC ACID, NIACIN, PYRIDOXINE, PANTOTHENIC ACID, FOLIC ACID, RIBOFLAVIN, THIAMIN, BIOTIN, COBALAMIN AND ZINC. 1 TABLET(S): 60; 20; 12.5; 10; 10; 1.7; 1.5; 1; .3; .006 TABLET, COATED ORAL at 11:41

## 2024-10-09 RX ADMIN — Medication 650 MILLIGRAM(S): at 12:20

## 2024-10-09 RX ADMIN — Medication 650 MILLIGRAM(S): at 11:47

## 2024-10-09 NOTE — PROGRESS NOTE ADULT - PROBLEM SELECTOR PROBLEM 3
Clavicle fracture

## 2024-10-09 NOTE — PROGRESS NOTE ADULT - PROBLEM SELECTOR PLAN 5
labs noted leukocytosis 19--> 15, suspect reactive in setting of alcohol intoxication/fall /shoulder fractures, low suspicion for active infection   Labs notable for AST/ALT elevation, suspect 2/2 alcohol use, continue to trend  Lactate 4.3--> 3.7--> 1.6    - fall precautions   - seizure precautions   - DASH Diet   - overall upper extremities fracture carry lower risk of VTE   - Given recent fall 10/2 and parietal scalp hematoma, will hold chemical DVT prophylaxis on admission will continue with SCDS    Plan for PT discussed with pt at bedside 10/5
labs noted leukocytosis 19--> 15, suspect reactive in setting of alcohol intoxication/fall /shoulder fractures, low suspicion for active infection   Labs notable for AST/ALT elevation, suspect 2/2 alcohol use, continue to trend  Lactate 4.3--> 3.7--> 1.6    - fall precautions   - seizure precautions   - DASH Diet   - overall upper extremities fracture carry lower risk of VTE   - Given recent fall 10/2 and parietal scalp hematoma, will hold chemical DVT prophylaxis on admission will continue with SCDS    Plan for PT discussed with pt at bedside 10/6
labs noted leukocytosis 19--> 15, suspect reactive in setting of alcohol intoxication/fall /shoulder fractures, low suspicion for active infection   Labs notable for AST/ALT elevation, suspect 2/2 alcohol use, continue to trend  Lactate 4.3--> 3.7--> 1.6    - fall precautions   - seizure precautions   - DASH Diet   - overall upper extremities fracture carry lower risk of VTE   - Given recent fall 10/2 and parietal scalp hematoma, will hold chemical DVT prophylaxis on admission will continue with SCDS.    Plan for PT discussed with pt at bedside 10/6
labs noted leukocytosis 19--> 15, suspect reactive in setting of alcohol intoxication/fall /shoulder fractures, low suspicion for active infection   Labs notable for AST/ALT elevation, suspect 2/2 alcohol use, continue to trend  Lactate 4.3--> 3.7--> 1.6    - fall precautions   - seizure precautions   - DASH Diet   - overall upper extremities fracture carry lower risk of VTE   - Given recent fall 10/2 and parietal scalp hematoma, will hold chemical DVT prophylaxis on admission will continue with SCDS    Plan for PT discussed with pt at bedside as well as CIWA monitoring 10/4
labs noted leukocytosis 19--> 15, suspect reactive in setting of alcohol intoxication/fall /shoulder fractures, low suspicion for active infection   Labs notable for AST/ALT elevation, suspect 2/2 alcohol use, continue to trend  Lactate 4.3--> 3.7--> 1.6    - fall precautions   - seizure precautions   - DASH Diet   - overall upper extremities fracture carry lower risk of VTE.  - Given recent fall 10/2 and parietal scalp hematoma, will hold chemical DVT prophylaxis on admission will continue with SCDS.    Plan for PT discussed with pt at bedside 10/6.
labs noted leukocytosis 19--> 15, suspect reactive in setting of alcohol intoxication/fall /shoulder fractures, low suspicion for active infection   Labs notable for AST/ALT elevation, suspect 2/2 alcohol use, continue to trend  Lactate 4.3--> 3.7--> 1.6    - fall precautions   - seizure precautions   - DASH Diet   - overall upper extremities fracture carry lower risk of VTE.  - Given recent fall 10/2 and parietal scalp hematoma, will hold chemical DVT prophylaxis on admission will continue with SCDS.    Plan for PT discussed with pt at bedside 10/6

## 2024-10-09 NOTE — PROGRESS NOTE ADULT - ASSESSMENT
68 yo M w/ PMH daily EtOH use, active tobacco smoker, COPD, HTN presents after falling down stairs. 
70 yo M w/ PMH daily EtOH use, active tobacco smoker, COPD, HTN presents after falling down stairs.
70 yo M w/ PMH daily EtOH use, active tobacco smoker, COPD, HTN presents after falling down stairs. 
68 yo M w/ PMH daily EtOH use, active tobacco smoker, COPD, HTN presents after falling down stairs
68 yo M w/ PMH daily EtOH use, active tobacco smoker, COPD, HTN presents after falling down stairs. 
68 yo M w/ PMH daily EtOH use, active tobacco smoker, COPD, HTN presents after falling down stairs

## 2024-10-09 NOTE — PROGRESS NOTE ADULT - PROBLEM SELECTOR PLAN 4
- home regimen: roflumilast 500mcg daily, trelergy Ellipta (fluticasone furoate /umeclidinium/vlanterol 200mcg/62.5mcg/25mcg) 1 inhalation per day  - hospital pharmacy don't carry roflumilast ---> advise patient to have family/friend bring it from home   - therapeutic equivalent for trerocio Ellianta ---> Advair + Spiriva
- home regimen: roflumilast 500mcg daily, trelergy Ellipta (fluticasone furoate /umeclidinium/vlanterol 200mcg/62.5mcg/25mcg) 1 inhalation per day  - hospital pharmacy don't carry roflumilast ---> advise patient to have family/friend bring it from home   - therapeutic equivalent for trelergy Ellpita ---> Advair + Spiriva.
- home regimen: roflumilast 500mcg daily, trelergy Ellipta (fluticasone furoate /umeclidinium/vlanterol 200mcg/62.5mcg/25mcg) 1 inhalation per day  - hospital pharmacy don't carry roflumilast ---> advise patient to have family/friend bring it from home   - therapeutic equivalent for trerocio Ellianta ---> Advair + Spiriva
- home regimen: roflumilast 500mcg daily, trelergy Ellipta (fluticasone furoate /umeclidinium/vlanterol 200mcg/62.5mcg/25mcg) 1 inhalation per day  - hospital pharmacy don't carry roflumilast ---> advise patient to have family/friend bring it from home   - therapeutic equivalent for trelergy Ellpita ---> Advair + Spiriva.
- home regimen: roflumilast 500mcg daily, trelergy Ellipta (fluticasone furoate /umeclidinium/vlanterol 200mcg/62.5mcg/25mcg) 1 inhalation per day  - hospital pharmacy don't carry roflumilast ---> advise patient to have family/friend bring it from home   - therapeutic equivalent for trerocio Ellianta ---> Advair + Spiriva
- home regimen: roflumilast 500mcg daily, trelergy Ellipta (fluticasone furoate /umeclidinium/vlanterol 200mcg/62.5mcg/25mcg) 1 inhalation per day  - hospital pharmacy don't carry roflumilast ---> advise patient to have family/friend bring it from home   - therapeutic equivalent for trerocio Ellianta ---> Advair + Spiriva

## 2024-10-09 NOTE — PROGRESS NOTE ADULT - PROBLEM SELECTOR PLAN 3
- CT noted Comminuted fracture of the left lateral clavicle. Additional minimally displaced fracture of the right lateral clavicle. Acute fracture of the right scapular spine. Chronic appearing fracture of the right L2 transverse process.  - s/p orthopedic team eval, rec: -NWB b/l UE in sling  -Ice  -Active movement of fingers/wrist/elbow encouraged  -Pt poor operative candidate due to hx of alcohol misuse syndrome  -Non operative management at this time  -Outpt follow up  - c/w pain management
- CT noted Comminuted fracture of the left lateral clavicle. Additional minimally displaced fracture of the right lateral clavicle. Acute fracture of the right scapular spine. Chronic appearing fracture of the right L2 transverse process.  - s/p orthopedic team eval, rec: -NWB b/l UE in sling  -Ice  -Active movement of fingers/wrist/elbow encouraged  -Pt poor operative candidate due to hx of alcohol misuse syndrome  -Non operative management at this time  -Outpt follow up  - c/w pain management
- CT noted Comminuted fracture of the left lateral clavicle. Additional minimally displaced fracture of the right lateral clavicle. Acute fracture of the right scapular spine. Chronic appearing fracture of the right L2 transverse process.  - s/p orthopedic team eval, rec: -NWB b/l UE in sling  -Ice  -Active movement of fingers/wrist/elbow encouraged  -Pt poor operative candidate due to hx of alcohol misuse syndrome  -Non operative management at this time  -Outpt follow up  - c/w pain management.
- CT noted Comminuted fracture of the left lateral clavicle. Additional minimally displaced fracture of the right lateral clavicle. Acute fracture of the right scapular spine. Chronic appearing fracture of the right L2 transverse process.  - s/p orthopedic team eval, rec: -NWB b/l UE in sling  -Ice  -Active movement of fingers/wrist/elbow encouraged  -Pt poor operative candidate due to hx of alcohol misuse syndrome  -Non operative management at this time  -Outpt follow up  - c/w pain management.
- CT noted Comminuted fracture of the left lateral clavicle. Additional minimally displaced fracture of the right lateral clavicle. Acute fracture of the right scapular spine. Chronic appearing fracture of the right L2 transverse process.  - s/p orthopedic team eval, rec: -NWB b/l UE in sling  -Ice  -Active movement of fingers/wrist/elbow encouraged  -Pt poor operative candidate due to hx of alcohol misuse syndrome  -Non operative management at this time  -Outpt follow up  - c/w pain management
- CT noted Comminuted fracture of the left lateral clavicle. Additional minimally displaced fracture of the right lateral clavicle. Acute fracture of the right scapular spine. Chronic appearing fracture of the right L2 transverse process.  - s/p orthopedic team eval, rec: -NWB b/l UE in sling  -Ice  -Active movement of fingers/wrist/elbow encouraged  -Pt poor operative candidate due to hx of alcohol misuse syndrome  -Non operative management at this time  -Outpt follow up  - c/w pain management

## 2024-10-09 NOTE — OCCUPATIONAL THERAPY INITIAL EVALUATION ADULT - PERTINENT HX OF CURRENT PROBLEM, REHAB EVAL
68 yo M w/ PMH daily EtOH use, active tobacco smoker, COPD, HTN presents after falling down stairs.  Patient lives on the 2nd floor of an apartment building. He said yesterday he was going back to his room then sudden fell down one flight of stairs to the first floor. He does not remember anything prior to the fall. He remember he hit his head and his shoulders at the end of the fall. The lady who lived down stairs screamed at him and called an ambulance. After the fall he had headache and bilateral shoulder pain. He cannot provide any further details. He noted he drank one pint of vodka yesterday, and he drinks at least 1 pint of vodka every day. He said the staircase was long and narrow and no one witnessed anything prior to the fall. Imaging from 10/2 as follows: XRAY PELVIS: (-). CXR: Left midlung linear atelectasis.  No pneumothorax. XRAY SHOULDER/CLAVICLE/HUMERUS L: Acute comminuted fracture of the lateral clavicle. CT HEAD: No CT evidence of acute intracranial pathology. CT CERVICAL SPINE: No CT evidence of cervical spine fracture, traumatic malalignment, or suspicious osseous lesion. Multilevel degenerative changes as discussed above.  Mild to moderate spinal canal stenosis at C5-C6, worse on the right side. Moderate atherosclerotic calcification of the carotid bifurcations, left greater than right. CT CHEST: Acute fractures involving bilateral clavicles and the right scapula. XRAY CLAVICLE R: Acute fx of distal R clavicle. Additional fx of scapula.

## 2024-10-09 NOTE — PROGRESS NOTE ADULT - PROBLEM SELECTOR PLAN 2
- reviewed CT head: no acute intracranial pathology . Other findings: There is high parietal scalp hematoma superior to the vertex, superiorly at the vertex in the midline, asymmetric to the left.  Deformity of the anterior aspect of the nasal bones, probably chronic.  - reviewed CT c spine: no CT evidence of cervical spine fracture, traumatic malalignment, or suspicious osseous lesion.   - reviewed CT chest /AP: acute fractures involving bilateral clavicles and the right scapula. No pneumothorax or hemothorax, no traumatic findings in the abdomen or pelvis   - fracture management per below   - suspect fall 2/2 alcohol intoxication, on admission blood alcohol level 295--> 78  - monitor on telemetry   - TTE wnl  - B12, folic acid levels, check TSH - all wnl  - PT eval, non-weight bearing bilateral upper extremities recommending [facility]
- reviewed CT head: no acute intracranial pathology . Other findings: There is high parietal scalp hematoma superior to the vertex, superiorly at the vertex in the midline, asymmetric to the left.  Deformity of the anterior aspect of the nasal bones, probably chronic.  - reviewed CT c spine: no CT evidence of cervical spine fracture, traumatic malalignment, or suspicious osseous lesion.   - reviewed CT chest /AP: acute fractures involving bilateral clavicles and the right scapula. No pneumothorax or hemothorax, no traumatic findings in the abdomen or pelvis   - fracture management per below   - suspect fall 2/2 alcohol intoxication, on admission blood alcohol level 295--> 78  - personally reviewed ECG 10/2 and 10/3, unremarkable.   - monitor on telemetry   - TTE wnl  - B12, folic acid levels, check TSH - all wnl  - PT eval, non-weight bearing bilateral upper extremities recommending [facility]
- reviewed CT head: no acute intracranial pathology . Other findings: There is high parietal scalp hematoma superior to the vertex, superiorly at the vertex in the midline, asymmetric to the left.  Deformity of the anterior aspect of the nasal bones, probably chronic.  - reviewed CT c spine: no CT evidence of cervical spine fracture, traumatic malalignment, or suspicious osseous lesion.   - reviewed CT chest /AP: acute fractures involving bilateral clavicles and the right scapula. No pneumothorax or hemothorax, no traumatic findings in the abdomen or pelvis   - fracture management per below   - suspect fall 2/2 alcohol intoxication, on admission blood alcohol level 295--> 78  - monitor on telemetry   - TTE wnl  - B12, folic acid levels, check TSH - all wnl  - PT eval, non-weight bearing bilateral upper extremities recommending [facility]
- reviewed CT head: no acute intracranial pathology . Other findings: There is high parietal scalp hematoma superior to the vertex, superiorly at the vertex in the midline, asymmetric to the left.  Deformity of the anterior aspect of the nasal bones, probably chronic.  - reviewed CT c spine: no CT evidence of cervical spine fracture, traumatic malalignment, or suspicious osseous lesion.   - reviewed CT chest /AP: acute fractures involving bilateral clavicles and the right scapula. No pneumothorax or hemothorax, no traumatic findings in the abdomen or pelvis   - fracture management per below   - suspect fall 2/2 alcohol intoxication, on admission blood alcohol level 295--> 78  - monitor on telemetry.  - TTE wnl  - B12, folic acid levels, check TSH - all wnl  - PT eval, non-weight bearing bilateral upper extremities recommending [facility].
- reviewed CT head: no acute intracranial pathology . Other findings: There is high parietal scalp hematoma superior to the vertex, superiorly at the vertex in the midline, asymmetric to the left.  Deformity of the anterior aspect of the nasal bones, probably chronic.  - reviewed CT c spine: no CT evidence of cervical spine fracture, traumatic malalignment, or suspicious osseous lesion.   - reviewed CT chest /AP: acute fractures involving bilateral clavicles and the right scapula. No pneumothorax or hemothorax, no traumatic findings in the abdomen or pelvis   - fracture management per below   - suspect fall 2/2 alcohol intoxication, on admission blood alcohol level 295--> 78  - monitor on telemetry .  - TTE wnl  - B12, folic acid levels, check TSH - all wnl  - PT eval, non-weight bearing bilateral upper extremities recommending [facility].
- reviewed CT head: no acute intracranial pathology . Other findings: There is high parietal scalp hematoma superior to the vertex, superiorly at the vertex in the midline, asymmetric to the left.  Deformity of the anterior aspect of the nasal bones, probably chronic.  - reviewed CT c spine: no CT evidence of cervical spine fracture, traumatic malalignment, or suspicious osseous lesion.   - reviewed CT chest /AP: acute fractures involving bilateral clavicles and the right scapula. No pneumothorax or hemothorax, no traumatic findings in the abdomen or pelvis   - fracture management per below   - suspect fall 2/2 alcohol intoxication, on admission blood alcohol level 295--> 78  - monitor on telemetry  - TTE wnl  - B12, folic acid levels, check TSH - all wnl  - PT eval, non-weight bearing bilateral upper extremities recommending [facility]

## 2024-10-09 NOTE — PROGRESS NOTE ADULT - SUBJECTIVE AND OBJECTIVE BOX
[facility] Division of Hospital Medicine  Magdi Silver DO  Pager (M-F, 8A-5P):  MS Teams PREFERRED  Other Times:  [phone]      SUBJECTIVE / OVERNIGHT EVENTS:  Patient seen at the bedside during morning rounds in no acute distress, denies abdominal pain, nausea, vomiting, chest pain, shortness of breath.     MEDICATIONS  (STANDING):  atorvastatin 40 milliGRAM(s) Oral at bedtime  fluticasone propionate/ salmeterol 250-50 MICROgram(s) Diskus 1 Dose(s) Inhalation two times a day  folic acid 1 milliGRAM(s) Oral daily  influenza  Vaccine (HIGH DOSE) 0.5 milliLiter(s) IntraMuscular once  lisinopril 20 milliGRAM(s) Oral daily  melatonin 5 milliGRAM(s) Oral at bedtime  melatonin 5 milliGRAM(s) Oral once  multivitamin 1 Tablet(s) Oral daily  polyethylene glycol 3350 17 Gram(s) Oral daily  tiotropium 2.5 MICROgram(s) Inhaler 2 Puff(s) Inhalation daily    MEDICATIONS  (PRN):  acetaminophen     Tablet .. 650 milliGRAM(s) Oral every 6 hours PRN Mild Pain (1 - 3)  benzocaine/menthol Lozenge 1 Lozenge Oral three times a day PRN Sore Throat  LORazepam   Injectable 1 milliGRAM(s) IV Push every 1 hour PRN CIWA-Ar score 8 or greater  ondansetron   Disintegrating Tablet 4 milliGRAM(s) Oral every 8 hours PRN Nausea and/or Vomiting  oxyCODONE    IR 5 milliGRAM(s) Oral every 6 hours PRN Severe Pain (7 - 10)      I&O's Summary    06 Oct 2024 07:01  -  07 Oct 2024 07:00  --------------------------------------------------------  IN: 240 mL / OUT: 1000 mL / NET: -760 mL        PHYSICAL EXAM:  Vital Signs Last 24 Hrs  T(C): 36.7 (07 Oct 2024 11:05), Max: 36.8 (06 Oct 2024 20:07)  T(F): 98 (07 Oct 2024 11:05), Max: 98.2 (06 Oct 2024 20:07)  HR: 74 (07 Oct 2024 11:05) (74 - 84)  BP: 113/59 (07 Oct 2024 11:05) (113/59 - 131/78)  BP(mean): --  RR: 18 (07 Oct 2024 11:05) (18 - 18)  SpO2: 94% (07 Oct 2024 11:05) (94% - 95%)    Parameters below as of 07 Oct 2024 11:05  Patient On (Oxygen Delivery Method): room air      CONSTITUTIONAL: NAD, well-developed, well-groomed  RESPIRATORY: Normal respiratory effort; lungs are clear to auscultation bilaterally  CARDIOVASCULAR: Regular rate and rhythm, normal S1 and S2, no murmur/rub/gallop; No lower extremity edema; Peripheral pulses are 2+ bilaterally  ABDOMEN: Nontender to palpation, normoactive bowel sounds, no rebound/guarding; No hepatosplenomegaly  MUSCULOSKELETAL:  Right arm sling. Normal gait; no clubbing or cyanosis of digits; no joint swelling or tenderness to palpation  PSYCH: A+O to person, place, and time; affect appropriate  NEUROLOGY: CN 2-12 are intact and symmetric; no gross sensory deficits   SKIN: No rashes; no palpable lesions    LABS:    10-06    138  |  100  |  13  ----------------------------<  107[H]  3.9   |  24  |  0.87    Ca    9.7      06 Oct 2024 07:25    TPro  7.1  /  Alb  3.7  /  TBili  1.0  /  DBili  x   /  AST  32  /  ALT  36  /  AlkPhos  92  10-06          Urinalysis Basic - ( 06 Oct 2024 07:25 )    Color: x / Appearance: x / SG: x / pH: x  Gluc: 107 mg/dL / Ketone: x  / Bili: x / Urobili: x   Blood: x / Protein: x / Nitrite: x   Leuk Esterase: x / RBC: x / WBC x   Sq Epi: x / Non Sq Epi: x / Bacteria: x          RADIOLOGY & ADDITIONAL TESTS:  Results Reviewed:   Imaging Personally Reviewed:  Electrocardiogram Personally Reviewed:    COORDINATION OF CARE:  Care Discussed with Consultants/Other Providers [Y/N]:  Prior or Outpatient Records Reviewed [Y/N]:  
[facility] Division of Hospital Medicine  Magdi Silver DO  Pager (M-F, 8A-5P):  MS Teams PREFERRED        SUBJECTIVE / OVERNIGHT EVENTS:  Patient seen at bedside in no acute distress  Sitting up in chair  No overnight events.     MEDICATIONS  (STANDING):  atorvastatin 40 milliGRAM(s) Oral at bedtime  fluticasone propionate/ salmeterol 250-50 MICROgram(s) Diskus 1 Dose(s) Inhalation two times a day  folic acid 1 milliGRAM(s) Oral daily  influenza  Vaccine (HIGH DOSE) 0.5 milliLiter(s) IntraMuscular once  lisinopril 20 milliGRAM(s) Oral daily  melatonin 5 milliGRAM(s) Oral once  multivitamin 1 Tablet(s) Oral daily  polyethylene glycol 3350 17 Gram(s) Oral daily  tiotropium 2.5 MICROgram(s) Inhaler 2 Puff(s) Inhalation daily    MEDICATIONS  (PRN):  acetaminophen     Tablet .. 650 milliGRAM(s) Oral every 6 hours PRN Mild Pain (1 - 3)  benzocaine/menthol Lozenge 1 Lozenge Oral three times a day PRN Sore Throat  ondansetron   Disintegrating Tablet 4 milliGRAM(s) Oral every 8 hours PRN Nausea and/or Vomiting  oxyCODONE    IR 5 milliGRAM(s) Oral every 6 hours PRN Severe Pain (7 - 10)      I&O's Summary    08 Oct 2024 07:01  -  09 Oct 2024 07:00  --------------------------------------------------------  IN: 240 mL / OUT: 650 mL / NET: -410 mL    09 Oct 2024 07:01  -  09 Oct 2024 13:23  --------------------------------------------------------  IN: 0 mL / OUT: 400 mL / NET: -400 mL        PHYSICAL EXAM:  Vital Signs Last 24 Hrs  T(C): 36.6 (09 Oct 2024 10:59), Max: 36.7 (09 Oct 2024 00:18)  T(F): 97.8 (09 Oct 2024 10:59), Max: 98.1 (09 Oct 2024 04:55)  HR: 83 (09 Oct 2024 10:04) (71 - 83)  BP: 127/78 (09 Oct 2024 10:04) (102/66 - 145/86)  BP(mean): --  RR: 18 (09 Oct 2024 10:04) (18 - 18)  SpO2: 95% (09 Oct 2024 10:04) (93% - 95%)    Parameters below as of 09 Oct 2024 10:04  Patient On (Oxygen Delivery Method): room air      CONSTITUTIONAL: NAD, well-developed, well-groomed  EYES: PERRLA; conjunctiva and sclera clear  ENMT: Moist oral mucosa, no pharyngeal injection or exudates; normal dentition  NECK: Supple, no palpable masses; no thyromegaly  RESPIRATORY: Normal respiratory effort; lungs are clear to auscultation bilaterally  CARDIOVASCULAR: Regular rate and rhythm, normal S1 and S2, no murmur/rub/gallop; No lower extremity edema; Peripheral pulses are 2+ bilaterally  ABDOMEN: Nontender to palpation, normoactive bowel sounds, no rebound/guarding; No hepatosplenomegaly  MUSCULOSKELETAL:  Normal gait; no clubbing or cyanosis of digits; no joint swelling or tenderness to palpation. Sling in place.   PSYCH: A+O to person, place, and time; affect appropriate  NEUROLOGY: CN 2-12 are intact and symmetric; no gross sensory deficits   SKIN: No rashes; no palpable lesions    LABS:                      RADIOLOGY & ADDITIONAL TESTS:  Results Reviewed:   Imaging Personally Reviewed:  Electrocardiogram Personally Reviewed:    COORDINATION OF CARE:  Care Discussed with Consultants/Other Providers [Y/N]:  Prior or Outpatient Records Reviewed [Y/N]:  
[facility] Division of Hospital Medicine  Mya Birmingham MD  Available via MS Teams      SUBJECTIVE / OVERNIGHT EVENTS: No acute events overnight.     ADDITIONAL REVIEW OF SYSTEMS: ROS negative    MEDICATIONS  (STANDING):  atorvastatin 40 milliGRAM(s) Oral at bedtime  fluticasone propionate/ salmeterol 250-50 MICROgram(s) Diskus 1 Dose(s) Inhalation two times a day  folic acid 1 milliGRAM(s) Oral daily  influenza  Vaccine (HIGH DOSE) 0.5 milliLiter(s) IntraMuscular once  lisinopril 20 milliGRAM(s) Oral daily  melatonin 5 milliGRAM(s) Oral at bedtime  melatonin 5 milliGRAM(s) Oral once  multivitamin 1 Tablet(s) Oral daily  polyethylene glycol 3350 17 Gram(s) Oral daily  tiotropium 2.5 MICROgram(s) Inhaler 2 Puff(s) Inhalation daily    MEDICATIONS  (PRN):  acetaminophen     Tablet .. 650 milliGRAM(s) Oral every 6 hours PRN Mild Pain (1 - 3)  benzocaine/menthol Lozenge 1 Lozenge Oral three times a day PRN Sore Throat  LORazepam   Injectable 1 milliGRAM(s) IV Push every 1 hour PRN CIWA-Ar score 8 or greater  ondansetron   Disintegrating Tablet 4 milliGRAM(s) Oral every 8 hours PRN Nausea and/or Vomiting  oxyCODONE    IR 5 milliGRAM(s) Oral every 6 hours PRN Severe Pain (7 - 10)      I&O's Summary    05 Oct 2024 07:01  -  06 Oct 2024 07:00  --------------------------------------------------------  IN: 0 mL / OUT: 600 mL / NET: -600 mL    06 Oct 2024 07:01  -  06 Oct 2024 16:36  --------------------------------------------------------  IN: 0 mL / OUT: 500 mL / NET: -500 mL        PHYSICAL EXAM:  Vital Signs Last 24 Hrs  T(C): 36.9 (06 Oct 2024 12:56), Max: 36.9 (06 Oct 2024 12:56)  T(F): 98.5 (06 Oct 2024 12:56), Max: 98.5 (06 Oct 2024 12:56)  HR: 80 (06 Oct 2024 12:56) (72 - 88)  BP: 180/89 (06 Oct 2024 12:56) (126/60 - 180/89)  BP(mean): --  RR: 18 (06 Oct 2024 12:56) (18 - 18)  SpO2: 96% (06 Oct 2024 12:56) (96% - 98%)    Parameters below as of 06 Oct 2024 12:56  Patient On (Oxygen Delivery Method): room air    CONSTITUTIONAL: NAD, well-developed, well-groomed, sitting up on bedside  NECK: Supple  RESPIRATORY: Normal respiratory effort; lungs are clear to auscultation bilaterally  CARDIOVASCULAR: Regular rate and rhythm, normal S1 and S  ABDOMEN: Nontender to palpation, normoactive bowel sounds  MUSCULOSKELETAL: Left arm in sling    LABS:    10-06    138  |  100  |  13  ----------------------------<  107[H]  3.9   |  24  |  0.87    Ca    9.7      06 Oct 2024 07:25    TPro  7.1  /  Alb  3.7  /  TBili  1.0  /  DBili  x   /  AST  32  /  ALT  36  /  AlkPhos  92  10-06          Urinalysis Basic - ( 06 Oct 2024 07:25 )    Color: x / Appearance: x / SG: x / pH: x  Gluc: 107 mg/dL / Ketone: x  / Bili: x / Urobili: x   Blood: x / Protein: x / Nitrite: x   Leuk Esterase: x / RBC: x / WBC x   Sq Epi: x / Non Sq Epi: x / Bacteria: x            RADIOLOGY & ADDITIONAL TESTS:  New Imaging Personally Reviewed Today:  New Electrocardiogram Personally Reviewed Today:  Other Results Reviewed Today:   Prior or Outpatient Records Reviewed Today with Summary:    COORDINATION OF CARE:  Consultant Communication and Details of Discussion (where applicable):    
[facility] Division of Hospital Medicine  Mya Birmingham MD  Available via MS Teams      SUBJECTIVE / OVERNIGHT EVENTS: No acute events overnight. Reports pain in the shoulder    ADDITIONAL REVIEW OF SYSTEMS: ROS negative otherwise    MEDICATIONS  (STANDING):  atorvastatin 40 milliGRAM(s) Oral at bedtime  fluticasone propionate/ salmeterol 250-50 MICROgram(s) Diskus 1 Dose(s) Inhalation two times a day  folic acid 1 milliGRAM(s) Oral daily  influenza  Vaccine (HIGH DOSE) 0.5 milliLiter(s) IntraMuscular once  lisinopril 10 milliGRAM(s) Oral daily  multivitamin 1 Tablet(s) Oral daily  thiamine Injectable 100 milliGRAM(s) IV Push daily  tiotropium 2.5 MICROgram(s) Inhaler 2 Puff(s) Inhalation daily    MEDICATIONS  (PRN):  acetaminophen     Tablet .. 650 milliGRAM(s) Oral every 6 hours PRN Mild Pain (1 - 3)  benzocaine/menthol Lozenge 1 Lozenge Oral three times a day PRN Sore Throat  LORazepam   Injectable 1 milliGRAM(s) IV Push every 1 hour PRN CIWA-Ar score 8 or greater  ondansetron   Disintegrating Tablet 4 milliGRAM(s) Oral every 8 hours PRN Nausea and/or Vomiting  oxyCODONE    IR 5 milliGRAM(s) Oral every 6 hours PRN Severe Pain (7 - 10)      I&O's Summary    03 Oct 2024 07:01  -  04 Oct 2024 07:00  --------------------------------------------------------  IN: 120 mL / OUT: 900 mL / NET: -780 mL    04 Oct 2024 07:01  -  04 Oct 2024 15:33  --------------------------------------------------------  IN: 0 mL / OUT: 250 mL / NET: -250 mL        PHYSICAL EXAM:  Vital Signs Last 24 Hrs  T(C): 36.8 (04 Oct 2024 13:04), Max: 37.2 (04 Oct 2024 08:56)  T(F): 98.3 (04 Oct 2024 13:04), Max: 98.9 (04 Oct 2024 08:56)  HR: 74 (04 Oct 2024 13:04) (74 - 91)  BP: 169/96 (04 Oct 2024 13:04) (155/85 - 190/98)  BP(mean): --  RR: 18 (04 Oct 2024 13:04) (18 - 18)  SpO2: 99% (04 Oct 2024 13:04) (95% - 99%)    Parameters below as of 04 Oct 2024 13:04  Patient On (Oxygen Delivery Method): room air      CONSTITUTIONAL: NAD, well-developed, well-groomed  NECK: Supple  RESPIRATORY: Normal respiratory effort; lungs are clear to auscultation bilaterally  CARDIOVASCULAR: Regular rate and rhythm, normal S1 and S  ABDOMEN: Nontender to palpation, normoactive bowel sounds  MUSCULOSKELETAL: Left arm in sling    LABS:                        11.1   9.26  )-----------( 241      ( 04 Oct 2024 07:02 )             35.3     10-04    138  |  100  |  10  ----------------------------<  114[H]  3.8   |  24  |  0.86    Ca    9.6      04 Oct 2024 07:03  Phos  2.7     10-04  Mg     2.0     10-04    TPro  6.9  /  Alb  4.0  /  TBili  1.2  /  DBili  x   /  AST  55[H]  /  ALT  42  /  AlkPhos  95  10-04    PT/INR - ( 03 Oct 2024 03:21 )   PT: 12.1 sec;   INR: 1.06 ratio         PTT - ( 03 Oct 2024 03:21 )  PTT:29.9 sec      Urinalysis Basic - ( 04 Oct 2024 07:03 )    Color: x / Appearance: x / SG: x / pH: x  Gluc: 114 mg/dL / Ketone: x  / Bili: x / Urobili: x   Blood: x / Protein: x / Nitrite: x   Leuk Esterase: x / RBC: x / WBC x   Sq Epi: x / Non Sq Epi: x / Bacteria: x            RADIOLOGY & ADDITIONAL TESTS:  New Imaging Personally Reviewed Today:  New Electrocardiogram Personally Reviewed Today:  Other Results Reviewed Today:   Prior or Outpatient Records Reviewed Today with Summary:    COORDINATION OF CARE:  Consultant Communication and Details of Discussion (where applicable):    
[facility] Division of Hospital Medicine  Mya Birmingham MD  Available via MS Teams      SUBJECTIVE / OVERNIGHT EVENTS: No acute events overnight. Pt reports feeling tired and having pain from fracture sites. Reports some constipation    ADDITIONAL REVIEW OF SYSTEMS: ROS negative other than above    MEDICATIONS  (STANDING):  atorvastatin 40 milliGRAM(s) Oral at bedtime  fluticasone propionate/ salmeterol 250-50 MICROgram(s) Diskus 1 Dose(s) Inhalation two times a day  folic acid 1 milliGRAM(s) Oral daily  influenza  Vaccine (HIGH DOSE) 0.5 milliLiter(s) IntraMuscular once  lisinopril 10 milliGRAM(s) Oral daily  multivitamin 1 Tablet(s) Oral daily  thiamine Injectable 100 milliGRAM(s) IV Push daily  tiotropium 2.5 MICROgram(s) Inhaler 2 Puff(s) Inhalation daily    MEDICATIONS  (PRN):  acetaminophen     Tablet .. 650 milliGRAM(s) Oral every 6 hours PRN Mild Pain (1 - 3)  benzocaine/menthol Lozenge 1 Lozenge Oral three times a day PRN Sore Throat  LORazepam   Injectable 1 milliGRAM(s) IV Push every 1 hour PRN CIWA-Ar score 8 or greater  ondansetron   Disintegrating Tablet 4 milliGRAM(s) Oral every 8 hours PRN Nausea and/or Vomiting  oxyCODONE    IR 5 milliGRAM(s) Oral every 6 hours PRN Severe Pain (7 - 10)      I&O's Summary    04 Oct 2024 07:01  -  05 Oct 2024 07:00  --------------------------------------------------------  IN: 240 mL / OUT: 250 mL / NET: -10 mL        PHYSICAL EXAM:  Vital Signs Last 24 Hrs  T(C): 36.9 (05 Oct 2024 04:17), Max: 36.9 (05 Oct 2024 04:17)  T(F): 98.5 (05 Oct 2024 04:17), Max: 98.5 (05 Oct 2024 04:17)  HR: 70 (05 Oct 2024 04:17) (70 - 91)  BP: 155/91 (05 Oct 2024 04:17) (149/82 - 169/96)  BP(mean): --  RR: 18 (05 Oct 2024 04:17) (18 - 18)  SpO2: 93% (05 Oct 2024 04:17) (93% - 99%)    Parameters below as of 05 Oct 2024 04:17  Patient On (Oxygen Delivery Method): room air          CONSTITUTIONAL: NAD, well-developed, well-groomed, sitting up on bedside  NECK: Supple  RESPIRATORY: Normal respiratory effort; lungs are clear to auscultation bilaterally  CARDIOVASCULAR: Regular rate and rhythm, normal S1 and S  ABDOMEN: Nontender to palpation, normoactive bowel sounds  MUSCULOSKELETAL: Left arm in sling    LABS:                        11.1   9.26  )-----------( 241      ( 04 Oct 2024 07:02 )             35.3     10-05    137  |  98  |  11  ----------------------------<  100[H]  3.7   |  25  |  0.90    Ca    9.6      05 Oct 2024 07:01  Phos  2.7     10-04  Mg     2.0     10-04    TPro  7.3  /  Alb  3.9  /  TBili  0.9  /  DBili  x   /  AST  43[H]  /  ALT  38  /  AlkPhos  90  10-05          Urinalysis Basic - ( 05 Oct 2024 07:01 )    Color: x / Appearance: x / SG: x / pH: x  Gluc: 100 mg/dL / Ketone: x  / Bili: x / Urobili: x   Blood: x / Protein: x / Nitrite: x   Leuk Esterase: x / RBC: x / WBC x   Sq Epi: x / Non Sq Epi: x / Bacteria: x            RADIOLOGY & ADDITIONAL TESTS:  New Imaging Personally Reviewed Today:  New Electrocardiogram Personally Reviewed Today:  Other Results Reviewed Today:   Prior or Outpatient Records Reviewed Today with Summary:    COORDINATION OF CARE:  Consultant Communication and Details of Discussion (where applicable):    
[facility] Division of Hospital Medicine  Magdi Silver DO  Pager (M-F, 8A-5P):  MS Teams PREFERRED        SUBJECTIVE / OVERNIGHT EVENTS:  Patient seen at the bedside during morning rounds in no acute distress, denies abdominal pain, nausea, vomiting, chest pain, shortness of breath.     MEDICATIONS  (STANDING):  atorvastatin 40 milliGRAM(s) Oral at bedtime  fluticasone propionate/ salmeterol 250-50 MICROgram(s) Diskus 1 Dose(s) Inhalation two times a day  folic acid 1 milliGRAM(s) Oral daily  influenza  Vaccine (HIGH DOSE) 0.5 milliLiter(s) IntraMuscular once  lisinopril 20 milliGRAM(s) Oral daily  melatonin 5 milliGRAM(s) Oral at bedtime  melatonin 5 milliGRAM(s) Oral once  multivitamin 1 Tablet(s) Oral daily  polyethylene glycol 3350 17 Gram(s) Oral daily  tiotropium 2.5 MICROgram(s) Inhaler 2 Puff(s) Inhalation daily    MEDICATIONS  (PRN):  acetaminophen     Tablet .. 650 milliGRAM(s) Oral every 6 hours PRN Mild Pain (1 - 3)  benzocaine/menthol Lozenge 1 Lozenge Oral three times a day PRN Sore Throat  LORazepam   Injectable 1 milliGRAM(s) IV Push every 1 hour PRN CIWA-Ar score 8 or greater  ondansetron   Disintegrating Tablet 4 milliGRAM(s) Oral every 8 hours PRN Nausea and/or Vomiting  oxyCODONE    IR 5 milliGRAM(s) Oral every 6 hours PRN Severe Pain (7 - 10)      I&O's Summary    07 Oct 2024 07:01  -  08 Oct 2024 07:00  --------------------------------------------------------  IN: 0 mL / OUT: 750 mL / NET: -750 mL        PHYSICAL EXAM:  Vital Signs Last 24 Hrs  T(C): 37 (08 Oct 2024 12:33), Max: 37.3 (08 Oct 2024 08:07)  T(F): 98.6 (08 Oct 2024 12:33), Max: 99.1 (08 Oct 2024 08:07)  HR: 73 (08 Oct 2024 12:33) (65 - 76)  BP: 109/71 (08 Oct 2024 12:46) (87/53 - 150/50)  BP(mean): --  RR: 18 (08 Oct 2024 12:33) (18 - 18)  SpO2: 94% (08 Oct 2024 12:33) (93% - 95%)    Parameters below as of 08 Oct 2024 12:33  Patient On (Oxygen Delivery Method): room air      CONSTITUTIONAL: NAD, well-developed, well-groomed  EYES: PERRLA; conjunctiva and sclera clear  ENMT: Moist oral mucosa, no pharyngeal injection or exudates; normal dentition  NECK: Supple, no palpable masses; no thyromegaly  RESPIRATORY: Normal respiratory effort; lungs are clear to auscultation bilaterally  CARDIOVASCULAR: Regular rate and rhythm, normal S1 and S2, no murmur/rub/gallop; No lower extremity edema; Peripheral pulses are 2+ bilaterally  ABDOMEN: Nontender to palpation, normoactive bowel sounds, no rebound/guarding; No hepatosplenomegaly  MUSCULOSKELETAL:  Normal gait; no clubbing or cyanosis of digits; no joint swelling or tenderness to palpation  PSYCH: A+O to person, place, and time; affect appropriate  NEUROLOGY: CN 2-12 are intact and symmetric; no gross sensory deficits   SKIN: No rashes; no palpable lesions    LABS:                      RADIOLOGY & ADDITIONAL TESTS:  Results Reviewed:   Imaging Personally Reviewed:  Electrocardiogram Personally Reviewed:    COORDINATION OF CARE:  Care Discussed with Consultants/Other Providers [Y/N]:  Prior or Outpatient Records Reviewed [Y/N]:

## 2024-10-09 NOTE — PROGRESS NOTE ADULT - TIME BILLING
Time-based billing (NON-critical care).     The necessity of the time spent during the encounter on this date of service was due to:     - Ordering, reviewing, and interpreting labs, testing, and imaging.  - Independently obtaining a review of systems and performing a physical exam  - Reviewing prior hospitalization and where necessary, outpatient records.  - Counselling and educating patient  regarding interpretation of aforementioned items and plan of care.
Time-based billing (NON-critical care).     The necessity of the time spent during the encounter on this date of service was due to:     - Ordering, reviewing, and interpreting labs, testing, and imaging.  - Independently obtaining a review of systems and performing a physical exam  - Reviewing prior hospitalization and where necessary, outpatient records.  - Counselling and educating patient and family regarding interpretation of aforementioned items and plan of care.
Time-based billing (NON-critical care).     The necessity of the time spent during the encounter on this date of service was due to:     - Ordering, reviewing, and interpreting labs, testing, and imaging.  - Independently obtaining a review of systems and performing a physical exam  - Reviewing prior hospitalization and where necessary, outpatient records.  - Counselling and educating patient  regarding interpretation of aforementioned items and plan of care.

## 2024-10-09 NOTE — PROGRESS NOTE ADULT - PROBLEM SELECTOR PLAN 1
- currently low suspicion for alcohol withdrawal  - patient denies history of alcohol withdrawal   - c/w [facility] protocol, symptom triggered   - monitor BMP Mg Phos, replete lytes PRN  - c/w multivitamin, folic acid, and thiamine supplements   - AST slightly elevated compared to ALT, consistent with alcohol use disorder  -  consult.
- currently low suspicion for alcohol withdrawal  - patient denies history of alcohol withdrawal   - c/w [facility] protocol, symptom triggered   - monitor BMP Mg Phos, replete lytes PRN  - c/w multivitamin, folic acid, and thiamine supplements   - AST slightly elevated compared to ALT, consistent with alcohol use disorder  -  consult.
- currently low suspicion for alcohol withdrawal  - patient denies history of alcohol withdrawal   - c/w [facility] protocol, symptom triggered   - monitor BMP Mg Phos, replete lytes PRN  - c/w multivitamin, folic acid, and thiamine supplements   - SW consult
- currently low suspicion for alcohol withdrawal  - patient denies history of alcohol withdrawal   - c/w [facility] protocol, symptom triggered   - monitor BMP Mg Phos, replete lytes PRN  - c/w multivitamin, folic acid, and thiamine supplements   - AST slightly elevated compared to ALT, consistent with alcohol use disorder  -  consult
- currently low suspicion for alcohol withdrawal  - patient denies history of alcohol withdrawal   - c/w [facility] protocol, symptom triggered   - monitor BMP Mg Phos, replete lytes PRN  - c/w multivitamin, folic acid, and thiamine supplements   - AST slightly elevated compared to ALT, consistent with alcohol use disorder  -  consult
- currently low suspicion for alcohol withdrawal  - patient denies history of alcohol withdrawal   - c/w [facility] protocol, symptom triggered   - monitor BMP Mg Phos, replete lytes PRN  - c/w multivitamin, folic acid, and thiamine supplements   - AST slightly elevated compared to ALT, consistent with alcohol use disorder  -  consult

## 2024-10-10 VITALS
SYSTOLIC BLOOD PRESSURE: 145 MMHG | TEMPERATURE: 98 F | OXYGEN SATURATION: 97 % | HEART RATE: 90 BPM | DIASTOLIC BLOOD PRESSURE: 86 MMHG | RESPIRATION RATE: 16 BRPM

## 2024-10-10 PROCEDURE — 99239 HOSP IP/OBS DSCHRG MGMT >30: CPT

## 2024-10-10 RX ORDER — OXYCODONE HYDROCHLORIDE 30 MG/1
1 TABLET, FILM COATED, EXTENDED RELEASE ORAL
Qty: 0 | Refills: 0 | DISCHARGE
Start: 2024-10-10

## 2024-10-10 RX ORDER — MULTI VITAMIN/MINERAL SUPPLEMENT WITH ASCORBIC ACID, NIACIN, PYRIDOXINE, PANTOTHENIC ACID, FOLIC ACID, RIBOFLAVIN, THIAMIN, BIOTIN, COBALAMIN AND ZINC. 60; 20; 12.5; 10; 10; 1.7; 1.5; 1; .3; .006 MG/1; MG/1; MG/1; MG/1; MG/1; MG/1; MG/1; MG/1; MG/1; MG/1
1 TABLET, COATED ORAL
Qty: 0 | Refills: 0 | DISCHARGE
Start: 2024-10-10

## 2024-10-10 RX ORDER — ACETAMINOPHEN 325 MG
2 TABLET ORAL
Qty: 0 | Refills: 0 | DISCHARGE
Start: 2024-10-10

## 2024-10-10 RX ADMIN — FOLIC ACID 1 MILLIGRAM(S): 1 TABLET ORAL at 13:25

## 2024-10-10 RX ADMIN — OXYCODONE HYDROCHLORIDE 5 MILLIGRAM(S): 30 TABLET, FILM COATED, EXTENDED RELEASE ORAL at 09:41

## 2024-10-10 RX ADMIN — Medication 20 MILLIGRAM(S): at 05:46

## 2024-10-10 RX ADMIN — TIOTROPIUM BROMIDE INHALATION SPRAY 2 PUFF(S): 3.12 SPRAY, METERED RESPIRATORY (INHALATION) at 13:26

## 2024-10-10 RX ADMIN — MULTI VITAMIN/MINERAL SUPPLEMENT WITH ASCORBIC ACID, NIACIN, PYRIDOXINE, PANTOTHENIC ACID, FOLIC ACID, RIBOFLAVIN, THIAMIN, BIOTIN, COBALAMIN AND ZINC. 1 TABLET(S): 60; 20; 12.5; 10; 10; 1.7; 1.5; 1; .3; .006 TABLET, COATED ORAL at 13:25

## 2024-10-10 RX ADMIN — Medication 1 DOSE(S): at 05:46

## 2024-10-10 RX ADMIN — Medication 17 GRAM(S): at 13:25

## 2024-10-10 NOTE — DISCHARGE NOTE PROVIDER - ATTENDING DISCHARGE PHYSICAL EXAMINATION:
CONSTITUTIONAL: NAD, well-developed, well-groomed  EYES: PERRLA; conjunctiva and sclera clear  ENMT: Moist oral mucosa, no pharyngeal injection or exudates; normal dentition  NECK: Supple, no palpable masses; no thyromegaly  RESPIRATORY: Normal respiratory effort; lungs are clear to auscultation bilaterally  CARDIOVASCULAR: Regular rate and rhythm, normal S1 and S2, no murmur/rub/gallop; No lower extremity edema; Peripheral pulses are 2+ bilaterally  ABDOMEN: Nontender to palpation, normoactive bowel sounds, no rebound/guarding; No hepatosplenomegaly  MUSCULOSKELETAL:  Normal gait; no clubbing or cyanosis of digits; no joint swelling or tenderness to palpation. Sling in place.   PSYCH: A+O to person, place, and time; affect appropriate  NEUROLOGY: CN 2-12 are intact and symmetric; no gross sensory deficits   SKIN: No rashes; no palpable lesions

## 2024-10-10 NOTE — DISCHARGE NOTE NURSING/CASE MANAGEMENT/SOCIAL WORK - NSDCPEWEB_GEN_ALL_CORE
[facility] for Tobacco Control website --- [email or web address] --- [email or web address]

## 2024-10-10 NOTE — DISCHARGE NOTE PROVIDER - HOSPITAL COURSE
Hospital Course:    68 yo M w/ PMH daily EtOH use, active tobacco smoker, COPD, HTN admitted after falling down stairs and c/f alcohol intoxication. S/p ciwa protocol, symptom triggered. C/w multivitamin, folic acid, and thiamine supplements. AST slightly elevated compared to ALT, consistent with alcohol use disorder. CT head: no acute intracranial pathology . Other findings: There is high parietal scalp hematoma superior to the vertex, superiorly at the vertex in the midline, asymmetric to the left.  Deformity of the anterior aspect of the nasal bones, probably chronic. CT c spine: no CT evidence of cervical spine fracture, traumatic malalignment, or suspicious osseous lesion. CT chest /AP: acute fractures involving bilateral clavicles and the right scapula. No pneumothorax or hemothorax, no traumatic findings in the abdomen or pelvis. Suspect fall 2/2 alcohol intoxication, on admission blood alcohol level 295--> 78. TTE wnl. B12, folic acid levels all wnl. PT recs non-weight bearing bilateral upper extremities recommending [facility]. For CT noted Comminuted fracture of the left lateral clavicle. Additional minimally displaced fracture of the right lateral clavicle. Acute fracture of the right scapular spine. Chronic appearing fracture of the right L2 transverse process. S/p orthopedic team eval, rec: -NWB b/l UE in sling. Pt poor operative candidate due to hx of alcohol misuse syndrome. Non operative management at this time. For advanced COPD, pt to c/w home medication. Labs noted leukocytosis 19--> 15, suspect reactive in setting of alcohol intoxication/fall /shoulder fractures, low suspicion for active infection. Lactate 4.3--> 3.7--> 1.6.    Medically cleared for discharge to [facility] per Dr. Silver.    Important Medication Changes and Reason: see med rec    Active or Pending Issues Requiring Follow-up: f/u PCP, ortho, pulm    Advanced Directives:   [X] Full code  [ ] DNR  [ ] Hospice    Discharge Diagnoses:  C/f alcohol intoxication  Clavicle fracture  Fall  Advanced COPD

## 2024-10-10 NOTE — DISCHARGE NOTE PROVIDER - NSDCCPCAREPLAN_GEN_ALL_CORE_FT
No
PRINCIPAL DISCHARGE DIAGNOSIS  Diagnosis: Fracture of shaft of left clavicle  Assessment and Plan of Treatment: CT noted Comminuted fracture of the left lateral clavicle. Additional minimally displaced fracture of the right lateral clavicle. Acute fracture of the right scapular spine. Chronic appearing fracture of the right L2 transverse process. S/p orthopedic team gemmaal, rec: -NWB b/l UE in sling. Non operative management at this time. Follow up with orthopedics and primary care doctor.      SECONDARY DISCHARGE DIAGNOSES  Diagnosis: Fall  Assessment and Plan of Treatment: CT head: no acute intracranial pathology . Other findings: There is high parietal scalp hematoma superior to the vertex, superiorly at the vertex in the midline, asymmetric to the left.  Deformity of the anterior aspect of the nasal bones, probably chronic. CT c spine: no CT evidence of cervical spine fracture, traumatic malalignment, or suspicious osseous lesion. CT chest /AP: acute fractures involving bilateral clavicles and the right scapula. No pneumothorax or hemothorax, no traumatic findings in the abdomen or pelvis.    Diagnosis: Acute alcohol intoxication  Assessment and Plan of Treatment: Continue with folic acid daily.  Alcohol cessation    Diagnosis: Advanced COPD  Assessment and Plan of Treatment: Continue home medication for COPD and follow up with primary care doctor and pulmonary regularly.

## 2024-10-10 NOTE — DISCHARGE NOTE NURSING/CASE MANAGEMENT/SOCIAL WORK - PATIENT PORTAL LINK FT
You can access the FollowMyHealth Patient Portal offered by [facility] by registering at the following website: [email or web address]. By joining [employer]’s FollowMyHealth portal, you will also be able to view your health information using other applications (apps) compatible with our system.

## 2024-10-10 NOTE — DISCHARGE NOTE NURSING/CASE MANAGEMENT/SOCIAL WORK - NSDCPEEMAIL_GEN_ALL_CORE
[facility] email [email or web address]

## 2024-10-10 NOTE — DISCHARGE NOTE PROVIDER - YES NO FOR [facility] POSITIVE OR NEGATIVE COVID RESULT
Parish Garcia, CNP     ,

## 2024-10-10 NOTE — DISCHARGE NOTE PROVIDER - NSDCMRMEDTOKEN_GEN_ALL_CORE_FT
acetaminophen 325 mg oral tablet: 2 tab(s) orally every 6 hours As needed Mild Pain (1 - 3)  atorvastatin 40 mg oral tablet: 1 tab(s) orally once a day  fluticasone/umeclidinium/vilanterol: 1 inhaler(s) inhaled once a day 200 mcg/62.5 mcg/25 mcg  folic acid: 1 milligram(s) orally once a day  lisinopril 20 mg oral tablet: 1 tab(s) orally once a day  Multiple Vitamins oral tablet: 1 tab(s) orally once a day  oxyCODONE 5 mg oral tablet: 1 tab(s) orally every 6 hours As needed Severe Pain (7 - 10)  polyethylene glycol 3350 oral powder for reconstitution: 17 gram(s) orally once a day  roflumilast 500 mcg oral tablet: 1 tab(s) orally once a day

## 2024-10-10 NOTE — DISCHARGE NOTE PROVIDER - CARE PROVIDER_API CALL
Gaurang Horta  Orthopaedic Surgery  [address] [place] [address]  Phone: [phone]  Fax: [phone]  Follow Up Time:     Adamaris Rios  [facility]  [address], [address]  [place] [address]  Phone: [phone]  Fax: [phone]  Follow Up Time: 1 week

## 2024-10-10 NOTE — DISCHARGE NOTE PROVIDER - CARE PROVIDERS DIRECT ADDRESSES
,[email or web address]

## 2024-10-10 NOTE — DISCHARGE NOTE NURSING/CASE MANAGEMENT/SOCIAL WORK - NSDCPEFALRISK_GEN_ALL_CORE
For information on Fall & Injury Prevention, visit: [email or web address] OR  [email or web address] OR  https://www.cdc.gov/steadi/patient.html

## 2024-10-21 RX ORDER — ATORVASTATIN CALCIUM 10 MG/1
1 TABLET, FILM COATED ORAL
Refills: 0 | DISCHARGE

## 2024-10-21 RX ORDER — FLUTICASONE FUROATE, UMECLIDINIUM BROMIDE AND VILANTEROL TRIFENATATE 100; 62.5; 25 UG/1; UG/1; UG/1
1 POWDER RESPIRATORY (INHALATION)
Refills: 0 | DISCHARGE

## 2024-10-21 RX ORDER — FOLIC ACID 1 MG/1
1 TABLET ORAL
Refills: 0 | DISCHARGE

## 2024-10-21 RX ORDER — ROFLUMILAST 500 UG/1
1 TABLET ORAL
Refills: 0 | DISCHARGE

## 2024-10-22 ENCOUNTER — APPOINTMENT (OUTPATIENT)
Dept: ORTHOPEDIC SURGERY | Facility: CLINIC | Age: 69
End: 2024-10-22

## 2024-10-22 VITALS
HEART RATE: 91 BPM | HEIGHT: 66 IN | BODY MASS INDEX: 33.43 KG/M2 | WEIGHT: 208 LBS | SYSTOLIC BLOOD PRESSURE: 118 MMHG | DIASTOLIC BLOOD PRESSURE: 71 MMHG

## 2024-10-22 PROCEDURE — 73000 X-RAY EXAM OF COLLAR BONE: CPT | Mod: 50

## 2024-10-22 PROCEDURE — 99024 POSTOP FOLLOW-UP VISIT: CPT

## 2024-10-22 PROCEDURE — 73010 X-RAY EXAM OF SHOULDER BLADE: CPT | Mod: RT

## 2024-11-12 ENCOUNTER — APPOINTMENT (OUTPATIENT)
Dept: PULMONOLOGY | Facility: CLINIC | Age: 69
End: 2024-11-12

## 2024-11-19 PROBLEM — S42.032D CLOSED DISPLACED FRACTURE OF ACROMIAL END OF LEFT CLAVICLE WITH ROUTINE HEALING, SUBSEQUENT ENCOUNTER: Status: ACTIVE | Noted: 2024-11-19

## 2024-11-19 PROBLEM — S42.114D CLOSED NONDISPLACED FRACTURE OF BODY OF RIGHT SCAPULA WITH ROUTINE HEALING, SUBSEQUENT ENCOUNTER: Status: ACTIVE | Noted: 2024-11-19

## 2024-11-19 PROBLEM — S42.031D CLOSED DISPLACED FRACTURE OF ACROMIAL END OF RIGHT CLAVICLE WITH ROUTINE HEALING, SUBSEQUENT ENCOUNTER: Status: ACTIVE | Noted: 2024-11-19

## 2024-12-03 ENCOUNTER — APPOINTMENT (OUTPATIENT)
Dept: ORTHOPEDIC SURGERY | Facility: CLINIC | Age: 69
End: 2024-12-03
Payer: MEDICARE

## 2024-12-03 VITALS — BODY MASS INDEX: 31.38 KG/M2 | HEIGHT: 66 IN | WEIGHT: 195.25 LBS

## 2024-12-03 DIAGNOSIS — S42.031D DISPLACED FRACTURE OF LATERAL END OF RIGHT CLAVICLE, SUBSEQUENT ENCOUNTER FOR FRACTURE WITH ROUTINE HEALING: ICD-10-CM

## 2024-12-03 DIAGNOSIS — S42.032D DISPLACED FRACTURE OF LATERAL END OF LEFT CLAVICLE, SUBSEQUENT ENCOUNTER FOR FRACTURE WITH ROUTINE HEALING: ICD-10-CM

## 2024-12-03 DIAGNOSIS — S42.114D NONDISPLACED FRACTURE OF BODY OF SCAPULA, RIGHT SHOULDER, SUBSEQUENT ENCOUNTER FOR FRACTURE WITH ROUTINE HEALING: ICD-10-CM

## 2024-12-03 PROCEDURE — 99024 POSTOP FOLLOW-UP VISIT: CPT

## 2024-12-03 PROCEDURE — 73000 X-RAY EXAM OF COLLAR BONE: CPT | Mod: 26,50

## 2025-01-14 ENCOUNTER — APPOINTMENT (OUTPATIENT)
Dept: CARDIOLOGY | Facility: CLINIC | Age: 70
End: 2025-01-14

## 2025-01-28 ENCOUNTER — APPOINTMENT (OUTPATIENT)
Dept: ORTHOPEDIC SURGERY | Facility: CLINIC | Age: 70
End: 2025-01-28
Payer: MEDICARE

## 2025-01-28 ENCOUNTER — APPOINTMENT (OUTPATIENT)
Dept: ORTHOPEDIC SURGERY | Facility: CLINIC | Age: 70
End: 2025-01-28

## 2025-01-28 VITALS — HEIGHT: 66 IN | WEIGHT: 195 LBS | BODY MASS INDEX: 31.34 KG/M2

## 2025-01-28 DIAGNOSIS — S42.032D DISPLACED FRACTURE OF LATERAL END OF LEFT CLAVICLE, SUBSEQUENT ENCOUNTER FOR FRACTURE WITH ROUTINE HEALING: ICD-10-CM

## 2025-01-28 DIAGNOSIS — S42.031D DISPLACED FRACTURE OF LATERAL END OF RIGHT CLAVICLE, SUBSEQUENT ENCOUNTER FOR FRACTURE WITH ROUTINE HEALING: ICD-10-CM

## 2025-01-28 DIAGNOSIS — M19.011 PRIMARY OSTEOARTHRITIS, RIGHT SHOULDER: ICD-10-CM

## 2025-01-28 PROCEDURE — 73000 X-RAY EXAM OF COLLAR BONE: CPT | Mod: 50

## 2025-01-28 PROCEDURE — 99213 OFFICE O/P EST LOW 20 MIN: CPT

## 2025-08-05 ENCOUNTER — APPOINTMENT (OUTPATIENT)
Dept: ORTHOPEDIC SURGERY | Facility: CLINIC | Age: 70
End: 2025-08-05
Payer: MEDICARE

## 2025-08-05 DIAGNOSIS — M19.011 PRIMARY OSTEOARTHRITIS, RIGHT SHOULDER: ICD-10-CM

## 2025-08-05 DIAGNOSIS — S42.032D DISPLACED FRACTURE OF LATERAL END OF LEFT CLAVICLE, SUBSEQUENT ENCOUNTER FOR FRACTURE WITH ROUTINE HEALING: ICD-10-CM

## 2025-08-05 DIAGNOSIS — S42.031D DISPLACED FRACTURE OF LATERAL END OF RIGHT CLAVICLE, SUBSEQUENT ENCOUNTER FOR FRACTURE WITH ROUTINE HEALING: ICD-10-CM

## 2025-08-05 PROCEDURE — 99213 OFFICE O/P EST LOW 20 MIN: CPT

## 2025-08-05 PROCEDURE — 73000 X-RAY EXAM OF COLLAR BONE: CPT | Mod: 50
